# Patient Record
Sex: MALE | Race: WHITE | Employment: OTHER | URBAN - METROPOLITAN AREA
[De-identification: names, ages, dates, MRNs, and addresses within clinical notes are randomized per-mention and may not be internally consistent; named-entity substitution may affect disease eponyms.]

---

## 2017-02-15 PROBLEM — Z85.51 HISTORY OF BLADDER CANCER: Status: ACTIVE | Noted: 2017-02-15

## 2017-04-20 PROBLEM — K43.9 VENTRAL HERNIA WITHOUT OBSTRUCTION OR GANGRENE: Status: ACTIVE | Noted: 2017-04-20

## 2017-05-01 ENCOUNTER — CARE COORDINATION (OUTPATIENT)
Dept: CARE COORDINATION | Age: 72
End: 2017-05-01

## 2017-09-01 PROBLEM — M94.262 CHONDROMALACIA, LEFT KNEE: Status: ACTIVE | Noted: 2017-09-01

## 2017-09-01 RX ORDER — LOSARTAN POTASSIUM 25 MG/1
25 TABLET ORAL DAILY
Qty: 30 TABLET | Refills: 5 | Status: SHIPPED | OUTPATIENT
Start: 2017-09-01 | End: 2022-05-26

## 2017-12-06 ENCOUNTER — TELEPHONE (OUTPATIENT)
Dept: FAMILY MEDICINE CLINIC | Age: 72
End: 2017-12-06

## 2017-12-06 NOTE — TELEPHONE ENCOUNTER
Pt called to request appt for Pre Op physical for upcoming hand surgery on 1/25/18. Pt not sure if appt is needed as he was just in to see Dr. Donovan Rivera not too long ago. Please advise.

## 2017-12-19 ENCOUNTER — OFFICE VISIT (OUTPATIENT)
Dept: FAMILY MEDICINE CLINIC | Age: 72
End: 2017-12-19

## 2017-12-19 VITALS
DIASTOLIC BLOOD PRESSURE: 80 MMHG | TEMPERATURE: 98.3 F | SYSTOLIC BLOOD PRESSURE: 172 MMHG | HEIGHT: 69 IN | BODY MASS INDEX: 34.13 KG/M2 | HEART RATE: 77 BPM | OXYGEN SATURATION: 98 % | WEIGHT: 230.4 LBS

## 2017-12-19 DIAGNOSIS — I10 ESSENTIAL HYPERTENSION: ICD-10-CM

## 2017-12-19 DIAGNOSIS — Z01.818 PRE-OP EXAM: Primary | ICD-10-CM

## 2017-12-19 DIAGNOSIS — E78.5 HYPERLIPIDEMIA, UNSPECIFIED HYPERLIPIDEMIA TYPE: ICD-10-CM

## 2017-12-19 PROCEDURE — G8484 FLU IMMUNIZE NO ADMIN: HCPCS | Performed by: FAMILY MEDICINE

## 2017-12-19 PROCEDURE — 99214 OFFICE O/P EST MOD 30 MIN: CPT | Performed by: FAMILY MEDICINE

## 2017-12-19 PROCEDURE — 93000 ELECTROCARDIOGRAM COMPLETE: CPT | Performed by: FAMILY MEDICINE

## 2017-12-19 PROCEDURE — G8427 DOCREV CUR MEDS BY ELIG CLIN: HCPCS | Performed by: FAMILY MEDICINE

## 2017-12-19 PROCEDURE — G8417 CALC BMI ABV UP PARAM F/U: HCPCS | Performed by: FAMILY MEDICINE

## 2017-12-19 PROCEDURE — 4040F PNEUMOC VAC/ADMIN/RCVD: CPT | Performed by: FAMILY MEDICINE

## 2017-12-19 PROCEDURE — 3017F COLORECTAL CA SCREEN DOC REV: CPT | Performed by: FAMILY MEDICINE

## 2017-12-19 ASSESSMENT — PATIENT HEALTH QUESTIONNAIRE - PHQ9
2. FEELING DOWN, DEPRESSED OR HOPELESS: 0
1. LITTLE INTEREST OR PLEASURE IN DOING THINGS: 0
SUM OF ALL RESPONSES TO PHQ9 QUESTIONS 1 & 2: 0
SUM OF ALL RESPONSES TO PHQ QUESTIONS 1-9: 0

## 2017-12-19 NOTE — PROGRESS NOTES
Preoperative Consultation    Liat Sigala  YOB: 1945    This patient presents to the office today for a preoperative consultation at the request of surgeon, Dr. Willem Baird, who plans on performing left thumb carpometacarpal joint arthroplasty with tendon transfer on December 26 at [facility]. Has been getting pain left MCP joint for some time; worsening. Has been getting cortisone shots for last 2 years and last 2 haven't helped. Planned anesthesia: uncertain; not on preoperative paperwork.     Known anesthesia problems: None   Bleeding risk: No recent or remote history of abnormal bleeding  Personal or FH of DVT/PE: No      Patient Active Problem List   Diagnosis    Pure hypercholesterolemia    Villous adenoma    Hypertension    Cataract, right,mature    Carcinoma of rectum, [facility] ([facility] [address]    History of cancer chemotherapy    History of external beam radiation therapy    Cirrhosis, cryptogenic ([facility] [address]    Cervical disc disease    History of bladder cancer    Ventral hernia without obstruction or gangrene    Chondromalacia, left knee     Past Surgical History:   Procedure Laterality Date    ABDOMEN SURGERY  1/2016    ap resection for rectal cancer    CERVICAL DISC ARTHROPLASTY  2015    C5    COLONOSCOPY  4/2/14    with ultrasound, biopsy taken    CYSTOSCOPY      exc bladder tumor    KNEE ARTHROSCOPY Left     OTHER SURGICAL HISTORY      EUS with fine needle biopsy of lymph node 6/8/11    OTHER SURGICAL HISTORY Left 6/29/15    left port a cath placement    RECTAL SURGERY      for cancer    SHOULDER ARTHROSCOPY         Allergies   Allergen Reactions    Lisinopril Other (See Comments)     Itchy throat with swelling     Outpatient Prescriptions Marked as Taking for the 12/19/17 encounter (Office Visit) with Pierre Key MD   Medication Sig Dispense Refill    losartan (COZAAR) 25 MG tablet Take 1 tablet by mouth daily 30 tablet 5 oropharynx is clear and moist and mucous membranes are normal.   Neck: Normal range of motion. Neck supple. No thyromegaly present. Cardiovascular: Normal rate, regular rhythm and normal heart sounds. No murmur heard. Pulses:       Posterior tibial pulses are 2+ on the right side, and 2+ on the left side. 2+pedal pulses; no LE edema   Pulmonary/Chest: Effort normal and breath sounds normal. No respiratory distress. He has no wheezes. He has no rales. Abdominal: Soft. Bowel sounds are normal. There is no tenderness. There is no rebound. Colostomy bag attached. Lymphadenopathy:     He has no cervical adenopathy. EKG Interpretation:  normal EKG, normal sinus rhythm, unchanged from previous tracings. Lab Review NA       Assessment:       Chayo Pat was seen today for pre-op exam.    Diagnoses and all orders for this visit:    Pre-op exam  -     EKG 12 Lead    Essential hypertension  -     CBC with Differential; Future  -     Comprehensive Metabolic Panel; Future    Hyperlipidemia, unspecified hyperlipidemia type  -     TSH without Reflex; Future  -     Lipid Panel; Future      67 y.o. patient  approved for Surgery         Plan:     1. Preoperative workup as follows: I have ordered his routine blood work which he is overdue for. He last had lipids checked in 2015, and they were elevated at that time. We discussed that if still elevated it would be recommended that he start on statin therapy for reduction in cardiovascular risk. 2. Change in medication regimen before surgery: I have asked him to check his blood pressures at home and if elevated we will increase his losartan to 50 mg daily. He states that he has whitecoat hypertension, but I worry that his blood pressure is remaining elevated outside of the office. He will return in 2 weeks for nurse visit and recheck blood pressure at that time. 3. No contraindications to planned surgery    Note electronically signed by provider.

## 2017-12-19 NOTE — PATIENT INSTRUCTIONS
If your blood pressures are routinely 140/90 or higher, then go ahead and double up on the losartan.

## 2017-12-20 ENCOUNTER — HOSPITAL ENCOUNTER (OUTPATIENT)
Dept: OTHER | Age: 72
Discharge: HOME | End: 2017-12-20
Attending: FAMILY MEDICINE | Admitting: FAMILY MEDICINE

## 2017-12-20 DIAGNOSIS — I10 ESSENTIAL HYPERTENSION: ICD-10-CM

## 2017-12-20 DIAGNOSIS — E78.5 HYPERLIPIDEMIA, UNSPECIFIED HYPERLIPIDEMIA TYPE: ICD-10-CM

## 2017-12-20 LAB
A/G RATIO: 1.4 (ref 1.1–2.2)
ALBUMIN SERPL-MCNC: 4.1 G/DL (ref 3.4–5)
ALP BLD-CCNC: 63 U/L (ref 40–129)
ALT SERPL-CCNC: 13 U/L (ref 10–40)
ANION GAP SERPL CALCULATED.3IONS-SCNC: 11 MMOL/L (ref 3–16)
AST SERPL-CCNC: 19 U/L (ref 15–37)
BASOPHILS ABSOLUTE: 0.1 K/UL (ref 0–0.2)
BASOPHILS RELATIVE PERCENT: 1.1 %
BILIRUB SERPL-MCNC: 0.4 MG/DL (ref 0–1)
BUN BLDV-MCNC: 16 MG/DL (ref 7–20)
CALCIUM SERPL-MCNC: 9.7 MG/DL (ref 8.3–10.6)
CHLORIDE BLD-SCNC: 104 MMOL/L (ref 99–110)
CHOLESTEROL, TOTAL: 227 MG/DL (ref 0–199)
CO2: 28 MMOL/L (ref 21–32)
CREAT SERPL-MCNC: 0.7 MG/DL (ref 0.8–1.3)
EOSINOPHILS ABSOLUTE: 0.3 K/UL (ref 0–0.6)
EOSINOPHILS RELATIVE PERCENT: 5 %
GFR AFRICAN AMERICAN: >60
GFR NON-AFRICAN AMERICAN: >60
GLOBULIN: 2.9 G/DL
GLUCOSE BLD-MCNC: 97 MG/DL (ref 70–99)
HCT VFR BLD CALC: 44.6 % (ref 40.5–52.5)
HDLC SERPL-MCNC: 37 MG/DL (ref 40–60)
HEMOGLOBIN: 14.7 G/DL (ref 13.5–17.5)
LDL CHOLESTEROL CALCULATED: 165 MG/DL
LYMPHOCYTES ABSOLUTE: 1.8 K/UL (ref 1–5.1)
LYMPHOCYTES RELATIVE PERCENT: 31.9 %
MCH RBC QN AUTO: 29.8 PG (ref 26–34)
MCHC RBC AUTO-ENTMCNC: 33 G/DL (ref 31–36)
MCV RBC AUTO: 90.3 FL (ref 80–100)
MONOCYTES ABSOLUTE: 0.5 K/UL (ref 0–1.3)
MONOCYTES RELATIVE PERCENT: 9.1 %
NEUTROPHILS ABSOLUTE: 3 K/UL (ref 1.7–7.7)
NEUTROPHILS RELATIVE PERCENT: 52.9 %
PDW BLD-RTO: 14.7 % (ref 12.4–15.4)
PLATELET # BLD: 147 K/UL (ref 135–450)
PMV BLD AUTO: 10.1 FL (ref 5–10.5)
POTASSIUM SERPL-SCNC: 4.5 MMOL/L (ref 3.5–5.1)
RBC # BLD: 4.95 M/UL (ref 4.2–5.9)
SODIUM BLD-SCNC: 143 MMOL/L (ref 136–145)
TOTAL PROTEIN: 7 G/DL (ref 6.4–8.2)
TRIGL SERPL-MCNC: 124 MG/DL (ref 0–150)
TSH SERPL DL<=0.05 MIU/L-ACNC: 3.32 UIU/ML (ref 0.27–4.2)
VLDLC SERPL CALC-MCNC: 25 MG/DL
WBC # BLD: 5.6 K/UL (ref 4–11)

## 2018-10-08 ENCOUNTER — OFFICE VISIT (OUTPATIENT)
Dept: FAMILY MEDICINE CLINIC | Age: 73
End: 2018-10-08
Payer: MEDICARE

## 2018-10-08 VITALS
TEMPERATURE: 98.2 F | WEIGHT: 227.8 LBS | HEART RATE: 83 BPM | OXYGEN SATURATION: 98 % | SYSTOLIC BLOOD PRESSURE: 138 MMHG | HEIGHT: 69 IN | DIASTOLIC BLOOD PRESSURE: 80 MMHG | BODY MASS INDEX: 33.74 KG/M2

## 2018-10-08 DIAGNOSIS — Z85.51 HISTORY OF BLADDER CANCER: ICD-10-CM

## 2018-10-08 DIAGNOSIS — Z23 NEED FOR IMMUNIZATION AGAINST INFLUENZA: ICD-10-CM

## 2018-10-08 DIAGNOSIS — Z01.818 PREOP EXAMINATION: Primary | ICD-10-CM

## 2018-10-08 PROCEDURE — 99214 OFFICE O/P EST MOD 30 MIN: CPT | Performed by: PHYSICIAN ASSISTANT

## 2018-10-08 PROCEDURE — 1036F TOBACCO NON-USER: CPT | Performed by: PHYSICIAN ASSISTANT

## 2018-10-08 PROCEDURE — 4040F PNEUMOC VAC/ADMIN/RCVD: CPT | Performed by: PHYSICIAN ASSISTANT

## 2018-10-08 PROCEDURE — G8427 DOCREV CUR MEDS BY ELIG CLIN: HCPCS | Performed by: PHYSICIAN ASSISTANT

## 2018-10-08 PROCEDURE — 1101F PT FALLS ASSESS-DOCD LE1/YR: CPT | Performed by: PHYSICIAN ASSISTANT

## 2018-10-08 PROCEDURE — G0008 ADMIN INFLUENZA VIRUS VAC: HCPCS | Performed by: PHYSICIAN ASSISTANT

## 2018-10-08 PROCEDURE — 1123F ACP DISCUSS/DSCN MKR DOCD: CPT | Performed by: PHYSICIAN ASSISTANT

## 2018-10-08 PROCEDURE — G8482 FLU IMMUNIZE ORDER/ADMIN: HCPCS | Performed by: PHYSICIAN ASSISTANT

## 2018-10-08 PROCEDURE — 90662 IIV NO PRSV INCREASED AG IM: CPT | Performed by: PHYSICIAN ASSISTANT

## 2018-10-08 PROCEDURE — 3017F COLORECTAL CA SCREEN DOC REV: CPT | Performed by: PHYSICIAN ASSISTANT

## 2018-10-08 PROCEDURE — G8417 CALC BMI ABV UP PARAM F/U: HCPCS | Performed by: PHYSICIAN ASSISTANT

## 2018-10-08 ASSESSMENT — PATIENT HEALTH QUESTIONNAIRE - PHQ9
1. LITTLE INTEREST OR PLEASURE IN DOING THINGS: 0
2. FEELING DOWN, DEPRESSED OR HOPELESS: 0
SUM OF ALL RESPONSES TO PHQ QUESTIONS 1-9: 0
SUM OF ALL RESPONSES TO PHQ QUESTIONS 1-9: 0
SUM OF ALL RESPONSES TO PHQ9 QUESTIONS 1 & 2: 0

## 2018-10-08 NOTE — PROGRESS NOTES
Vaccine Information Sheet, \"Influenza - Inactivated\"  given to Lori Walters, or parent/legal guardian of  Lori Walters and verbalized understanding. Patient responses:    Have you ever had a reaction to a flu vaccine? No  Are you able to eat eggs without adverse effects? Yes  Do you have any current illness? No  Have you ever had Guillian [place] Syndrome? No    Flu vaccine given per order. Please see immunization tab.     Immunization(s) given during visit:     Immunizations     Name Date Dose Route    Influenza, High Dose (Fluzone 65 yrs and older) 10/8/2018 0.5 mL Intramuscular    Site: Deltoid- Left    Lot: [id number]    NDC: 16039-495-07

## 2019-04-15 ENCOUNTER — CARE COORDINATION (OUTPATIENT)
Dept: CASE MANAGEMENT | Age: 74
End: 2019-04-15

## 2019-04-15 NOTE — CARE COORDINATION
[facility] Transitions Initial Follow Up Call    Call within 2 business days of discharge: Yes    Patient: Beauty Galeazzi Patient : 1945   MRN: <[id number]>  Reason for Admission: SBO  Discharge Date: 19 RARS: No data recorded    Last Discharge [facility]     None           Spoke with: Sabine Jane [facility]    Facility: [facility]    Non-face-to-face services provided:  Obtained and reviewed discharge summary and/or continuity of care documents    Care Transitions 24 Hour Call    Do you have any ongoing symptoms?:  No  Do you have a copy of your discharge instructions?:  Yes  Do you have all of your prescriptions and are they filled?:  Yes  Have you been contacted by a [employer] [address]?:  No  Have you scheduled your follow up appointment?:  No  Were you discharged with any Home Care or Post Acute Services:  No  Post Acute Services:  Home Health  Do you feel like you have everything you need to keep you well at home?:  Yes  Care Transitions Interventions  No Identified Needs         Follow Up: Sabine Jane, she stated that patient is home and doing \"ok\", he has colostomy, she stated that she is eating and drinking fluids, plan is for bowel surgery, no date yet, patient will f/u with surgeon next week and has transportation, they have everything they need. Patient is not having pain, severe bloating or discomfort, fever, chills, chest pain, sob. Sabine Jane stated patient does not have [address], he has and is taking his medications, no issues, needs, or concerns to report per Sabine Jane, she is aware of when to call patient's doctor or report to ED for worsening or severe sx. No future appointments.     Ahsan Tello RN BSN  Care Transitions Coordinator

## 2019-05-14 ENCOUNTER — OFFICE VISIT (OUTPATIENT)
Dept: FAMILY MEDICINE CLINIC | Age: 74
End: 2019-05-14
Payer: MEDICARE

## 2019-05-14 VITALS
OXYGEN SATURATION: 98 % | DIASTOLIC BLOOD PRESSURE: 80 MMHG | WEIGHT: 227 LBS | SYSTOLIC BLOOD PRESSURE: 132 MMHG | BODY MASS INDEX: 33.28 KG/M2 | HEART RATE: 93 BPM | TEMPERATURE: 98.5 F

## 2019-05-14 DIAGNOSIS — I10 ESSENTIAL HYPERTENSION: ICD-10-CM

## 2019-05-14 DIAGNOSIS — Z01.818 PREOP EXAMINATION: Primary | ICD-10-CM

## 2019-05-14 DIAGNOSIS — K43.9 VENTRAL HERNIA WITHOUT OBSTRUCTION OR GANGRENE: ICD-10-CM

## 2019-05-14 PROCEDURE — G8427 DOCREV CUR MEDS BY ELIG CLIN: HCPCS | Performed by: PHYSICIAN ASSISTANT

## 2019-05-14 PROCEDURE — 3017F COLORECTAL CA SCREEN DOC REV: CPT | Performed by: PHYSICIAN ASSISTANT

## 2019-05-14 PROCEDURE — 99214 OFFICE O/P EST MOD 30 MIN: CPT | Performed by: PHYSICIAN ASSISTANT

## 2019-05-14 PROCEDURE — 1123F ACP DISCUSS/DSCN MKR DOCD: CPT | Performed by: PHYSICIAN ASSISTANT

## 2019-05-14 PROCEDURE — G8417 CALC BMI ABV UP PARAM F/U: HCPCS | Performed by: PHYSICIAN ASSISTANT

## 2019-05-14 PROCEDURE — 4040F PNEUMOC VAC/ADMIN/RCVD: CPT | Performed by: PHYSICIAN ASSISTANT

## 2019-05-14 PROCEDURE — 1036F TOBACCO NON-USER: CPT | Performed by: PHYSICIAN ASSISTANT

## 2019-05-14 ASSESSMENT — PATIENT HEALTH QUESTIONNAIRE - PHQ9
SUM OF ALL RESPONSES TO PHQ QUESTIONS 1-9: 0
SUM OF ALL RESPONSES TO PHQ QUESTIONS 1-9: 0
1. LITTLE INTEREST OR PLEASURE IN DOING THINGS: 0
2. FEELING DOWN, DEPRESSED OR HOPELESS: 0
SUM OF ALL RESPONSES TO PHQ9 QUESTIONS 1 & 2: 0

## 2019-05-14 NOTE — PATIENT INSTRUCTIONS
Baylee Seth was seen today for pre-op exam.    Diagnoses and all orders for this visit:    Preop examination    Ventral hernia without obstruction or gangrene    Essential hypertension       Take your BP medicine the morning of surgery.

## 2019-05-14 NOTE — PROGRESS NOTES
Preoperative Consultation    Alisa Evans  YOB: 1945    This patient presents to the office today for a preoperative consultation at the request of surgeon, Dr. James Dasilva, who plans on performing ventral hernia repair near colostomy site on May 17 at [facility].      He has stable, controlled, and treated HTN. It was elevated when he first got here but much better now. No SE's to his medicine.     Planned anesthesia: General   Known anesthesia problems: None   Bleeding risk: No recent or remote history of abnormal bleeding  Personal or FH of DVT/PE: No      Patient Active Problem List   Diagnosis    Pure hypercholesterolemia    Villous adenoma    Hypertension    Cataract, right,mature    Carcinoma of rectum, [facility] ([facility] [address]    History of cancer chemotherapy    History of external beam radiation therapy    Cirrhosis, cryptogenic ([address]    Cervical disc disease    History of bladder cancer    Ventral hernia without obstruction or gangrene    Chondromalacia, left knee     Past Surgical History:   Procedure Laterality Date    ABDOMEN SURGERY  1/2016    ap resection for rectal cancer    CERVICAL DISC ARTHROPLASTY  2015    C5    COLONOSCOPY  4/2/14    with ultrasound, biopsy taken    CYSTOSCOPY      exc bladder tumor    KNEE ARTHROSCOPY Left     OTHER SURGICAL HISTORY      EUS with fine needle biopsy of lymph node 6/8/11    OTHER SURGICAL HISTORY Left 6/29/15    left port a cath placement    RECTAL SURGERY      for cancer    SHOULDER ARTHROSCOPY         Allergies   Allergen Reactions    Lisinopril Other (See Comments)     Itchy throat with swelling     Outpatient Medications Marked as Taking for the 5/14/19 encounter (Office Visit) with ELLIE Gordillo   Medication Sig Dispense Refill    losartan (COZAAR) 25 MG tablet Take 1 tablet by mouth daily 30 tablet 5       Social History     Tobacco Use    Smoking status: Former Smoker     Packs/day: 1.00     Years: 40.00 Pack years: 40.00     Types: Cigarettes     Last attempt to quit: 1/6/2016     Years since quitting: 3.3    Smokeless tobacco: Never Used    Tobacco comment: quit 1/6/2016   Substance Use Topics    Alcohol use: Yes     Alcohol/week: 0.0 oz     Types: 2 Cans of beer per week     Comment: rare     Family History   Problem Relation Age of Onset    Diabetes Sister     Diabetes Brother     Other Maternal Aunt         cirrhosis, was drinker       Review of Systems  A comprehensive review of systems was negative except for what was noted in the HPI. Physical Exam   Wt 227 lb (103 kg)   BMI 33.28 kg/m²   Weight: 227 lb (103 kg)   Constitutional: He is oriented to person, place, and time. He appears well-developed and well-nourished. No distress. HENT:   Head: Normocephalic and atraumatic. Mouth/Throat: Uvula is midline, oropharynx is clear and moist and mucous membranes are normal.   Eyes: Conjunctivae and EOM are normal. Pupils are equal, round, and reactive to light. Neck: Trachea normal and normal range of motion. Neck supple. No JVD present. Carotid bruit is not present. No mass and no thyromegaly present. Cardiovascular: Normal rate, regular rhythm, normal heart sounds and intact distal pulses. Exam reveals no gallop and no friction rub. No murmur heard. Pulmonary/Chest: Effort normal and breath sounds normal. No respiratory distress. He has no wheezes. He has no rales. Abdominal: Soft. Normal aorta and bowel sounds are normal. He exhibits no distension and no mass. There is no hepatosplenomegaly. No tenderness. Musculoskeletal: He exhibits no edema and no tenderness. Neurological: He is alert and oriented to person, place, and time. He has normal strength. No cranial nerve deficit or sensory deficit. Coordination and gait normal.   Skin: Skin is warm and dry. No rash noted. No erythema.      EKG Interpretation: had a recent one done    Lab Review: PAT's yesterday       Assessment:

## 2019-10-24 ENCOUNTER — ANESTHESIA EVENT (OUTPATIENT)
Dept: ENDOSCOPY | Age: 74
End: 2019-10-24
Payer: MEDICARE

## 2019-10-25 ENCOUNTER — ANESTHESIA (OUTPATIENT)
Dept: ENDOSCOPY | Age: 74
End: 2019-10-25
Payer: MEDICARE

## 2019-10-25 ENCOUNTER — HOSPITAL ENCOUNTER (OUTPATIENT)
Age: 74
Setting detail: OUTPATIENT SURGERY
Discharge: HOME | End: 2019-10-25
Attending: INTERNAL MEDICINE | Admitting: INTERNAL MEDICINE
Payer: MEDICARE

## 2019-10-25 VITALS
TEMPERATURE: 97 F | SYSTOLIC BLOOD PRESSURE: 151 MMHG | WEIGHT: 220 LBS | OXYGEN SATURATION: 98 % | DIASTOLIC BLOOD PRESSURE: 73 MMHG | HEART RATE: 61 BPM | HEIGHT: 69 IN | BODY MASS INDEX: 32.58 KG/M2 | RESPIRATION RATE: 16 BRPM

## 2019-10-25 VITALS
OXYGEN SATURATION: 97 % | RESPIRATION RATE: 21 BRPM | SYSTOLIC BLOOD PRESSURE: 113 MMHG | DIASTOLIC BLOOD PRESSURE: 53 MMHG

## 2019-10-25 PROCEDURE — 6360000002 HC RX W HCPCS: Performed by: NURSE ANESTHETIST, CERTIFIED REGISTERED

## 2019-10-25 PROCEDURE — 2580000003 HC RX 258: Performed by: NURSE ANESTHETIST, CERTIFIED REGISTERED

## 2019-10-25 PROCEDURE — 3700000000 HC ANESTHESIA ATTENDED CARE: Performed by: INTERNAL MEDICINE

## 2019-10-25 PROCEDURE — 7100000011 HC PHASE II RECOVERY - ADDTL 15 MIN: Performed by: INTERNAL MEDICINE

## 2019-10-25 PROCEDURE — 2709999900 HC NON-CHARGEABLE SUPPLY: Performed by: INTERNAL MEDICINE

## 2019-10-25 PROCEDURE — 3700000001 HC ADD 15 MINUTES (ANESTHESIA): Performed by: INTERNAL MEDICINE

## 2019-10-25 PROCEDURE — 7100000010 HC PHASE II RECOVERY - FIRST 15 MIN: Performed by: INTERNAL MEDICINE

## 2019-10-25 PROCEDURE — 2500000003 HC RX 250 WO HCPCS: Performed by: NURSE ANESTHETIST, CERTIFIED REGISTERED

## 2019-10-25 PROCEDURE — 3609010400 HC COLONOSCOPY POLYPECTOMY HOT BIOPSY: Performed by: INTERNAL MEDICINE

## 2019-10-25 PROCEDURE — 88305 TISSUE EXAM BY PATHOLOGIST: CPT

## 2019-10-25 PROCEDURE — 7100000000 HC PACU RECOVERY - FIRST 15 MIN: Performed by: INTERNAL MEDICINE

## 2019-10-25 RX ORDER — LABETALOL HYDROCHLORIDE 5 MG/ML
5 INJECTION, SOLUTION INTRAVENOUS EVERY 10 MIN PRN
Status: DISCONTINUED | OUTPATIENT
Start: 2019-10-25 | End: 2019-10-25 | Stop reason: HOSPADM

## 2019-10-25 RX ORDER — SODIUM CHLORIDE 9 MG/ML
INJECTION, SOLUTION INTRAVENOUS CONTINUOUS PRN
Status: DISCONTINUED | OUTPATIENT
Start: 2019-10-25 | End: 2019-10-25 | Stop reason: SDUPTHER

## 2019-10-25 RX ORDER — PROPOFOL 10 MG/ML
INJECTION, EMULSION INTRAVENOUS CONTINUOUS PRN
Status: DISCONTINUED | OUTPATIENT
Start: 2019-10-25 | End: 2019-10-25 | Stop reason: SDUPTHER

## 2019-10-25 RX ORDER — HYDRALAZINE HYDROCHLORIDE 20 MG/ML
5 INJECTION INTRAMUSCULAR; INTRAVENOUS
Status: DISCONTINUED | OUTPATIENT
Start: 2019-10-25 | End: 2019-10-25 | Stop reason: HOSPADM

## 2019-10-25 RX ORDER — OXYCODONE HYDROCHLORIDE AND ACETAMINOPHEN 5; 325 MG/1; MG/1
1 TABLET ORAL PRN
Status: DISCONTINUED | OUTPATIENT
Start: 2019-10-25 | End: 2019-10-25 | Stop reason: HOSPADM

## 2019-10-25 RX ORDER — ONDANSETRON 2 MG/ML
INJECTION INTRAMUSCULAR; INTRAVENOUS PRN
Status: DISCONTINUED | OUTPATIENT
Start: 2019-10-25 | End: 2019-10-25 | Stop reason: SDUPTHER

## 2019-10-25 RX ORDER — MORPHINE SULFATE 2 MG/ML
2 INJECTION, SOLUTION INTRAMUSCULAR; INTRAVENOUS EVERY 5 MIN PRN
Status: DISCONTINUED | OUTPATIENT
Start: 2019-10-25 | End: 2019-10-25 | Stop reason: HOSPADM

## 2019-10-25 RX ORDER — MORPHINE SULFATE 2 MG/ML
1 INJECTION, SOLUTION INTRAMUSCULAR; INTRAVENOUS EVERY 5 MIN PRN
Status: DISCONTINUED | OUTPATIENT
Start: 2019-10-25 | End: 2019-10-25 | Stop reason: HOSPADM

## 2019-10-25 RX ORDER — LIDOCAINE HYDROCHLORIDE 20 MG/ML
INJECTION, SOLUTION EPIDURAL; INFILTRATION; INTRACAUDAL; PERINEURAL PRN
Status: DISCONTINUED | OUTPATIENT
Start: 2019-10-25 | End: 2019-10-25 | Stop reason: SDUPTHER

## 2019-10-25 RX ORDER — PROPOFOL 10 MG/ML
INJECTION, EMULSION INTRAVENOUS PRN
Status: DISCONTINUED | OUTPATIENT
Start: 2019-10-25 | End: 2019-10-25 | Stop reason: SDUPTHER

## 2019-10-25 RX ORDER — MEPERIDINE HYDROCHLORIDE 25 MG/ML
12.5 INJECTION INTRAMUSCULAR; INTRAVENOUS; SUBCUTANEOUS EVERY 5 MIN PRN
Status: DISCONTINUED | OUTPATIENT
Start: 2019-10-25 | End: 2019-10-25 | Stop reason: HOSPADM

## 2019-10-25 RX ORDER — OXYCODONE HYDROCHLORIDE AND ACETAMINOPHEN 5; 325 MG/1; MG/1
2 TABLET ORAL PRN
Status: DISCONTINUED | OUTPATIENT
Start: 2019-10-25 | End: 2019-10-25 | Stop reason: HOSPADM

## 2019-10-25 RX ORDER — SODIUM CHLORIDE 0.9 % (FLUSH) 0.9 %
10 SYRINGE (ML) INJECTION EVERY 12 HOURS SCHEDULED
Status: DISCONTINUED | OUTPATIENT
Start: 2019-10-25 | End: 2019-10-25 | Stop reason: HOSPADM

## 2019-10-25 RX ORDER — SODIUM CHLORIDE 9 MG/ML
INJECTION, SOLUTION INTRAVENOUS CONTINUOUS
Status: DISCONTINUED | OUTPATIENT
Start: 2019-10-25 | End: 2019-10-25 | Stop reason: HOSPADM

## 2019-10-25 RX ORDER — ONDANSETRON 2 MG/ML
4 INJECTION INTRAMUSCULAR; INTRAVENOUS
Status: DISCONTINUED | OUTPATIENT
Start: 2019-10-25 | End: 2019-10-25 | Stop reason: HOSPADM

## 2019-10-25 RX ORDER — SODIUM CHLORIDE 0.9 % (FLUSH) 0.9 %
10 SYRINGE (ML) INJECTION PRN
Status: DISCONTINUED | OUTPATIENT
Start: 2019-10-25 | End: 2019-10-25 | Stop reason: HOSPADM

## 2019-10-25 RX ADMIN — ONDANSETRON 4 MG: 2 INJECTION INTRAMUSCULAR; INTRAVENOUS at 07:45

## 2019-10-25 RX ADMIN — PROPOFOL 30 MG: 10 INJECTION, EMULSION INTRAVENOUS at 07:42

## 2019-10-25 RX ADMIN — LIDOCAINE HYDROCHLORIDE 60 MG: 20 INJECTION, SOLUTION EPIDURAL; INFILTRATION; INTRACAUDAL; PERINEURAL at 07:42

## 2019-10-25 RX ADMIN — PROPOFOL 180 MCG/KG/MIN: 10 INJECTION, EMULSION INTRAVENOUS at 07:42

## 2019-10-25 RX ADMIN — SODIUM CHLORIDE: 9 INJECTION, SOLUTION INTRAVENOUS at 07:27

## 2019-10-25 ASSESSMENT — PULMONARY FUNCTION TESTS
PIF_VALUE: 0
PIF_VALUE: 0
PIF_VALUE: 2
PIF_VALUE: 0
PIF_VALUE: 1
PIF_VALUE: 0
PIF_VALUE: 0
PIF_VALUE: 1
PIF_VALUE: 0
PIF_VALUE: 1
PIF_VALUE: 1
PIF_VALUE: 0
PIF_VALUE: 0
PIF_VALUE: 1
PIF_VALUE: 0
PIF_VALUE: 1
PIF_VALUE: 0
PIF_VALUE: 0
PIF_VALUE: 1
PIF_VALUE: 0
PIF_VALUE: 1
PIF_VALUE: 0
PIF_VALUE: 1
PIF_VALUE: 1
PIF_VALUE: 0
PIF_VALUE: 1
PIF_VALUE: 0

## 2019-10-25 ASSESSMENT — PAIN SCALES - GENERAL
PAINLEVEL_OUTOF10: 0

## 2019-10-25 ASSESSMENT — ENCOUNTER SYMPTOMS: SHORTNESS OF BREATH: 0

## 2019-10-25 ASSESSMENT — PAIN - FUNCTIONAL ASSESSMENT: PAIN_FUNCTIONAL_ASSESSMENT: 0-10

## 2022-05-24 RX ORDER — LOSARTAN POTASSIUM 25 MG/1
25 TABLET ORAL DAILY
Qty: 90 TABLET | Refills: 0 | OUTPATIENT
Start: 2022-05-24

## 2022-05-26 ENCOUNTER — OFFICE VISIT (OUTPATIENT)
Dept: FAMILY MEDICINE CLINIC | Age: 77
End: 2022-05-26
Payer: MEDICARE

## 2022-05-26 VITALS
TEMPERATURE: 97.7 F | WEIGHT: 244 LBS | SYSTOLIC BLOOD PRESSURE: 148 MMHG | DIASTOLIC BLOOD PRESSURE: 84 MMHG | BODY MASS INDEX: 36.14 KG/M2 | HEIGHT: 69 IN

## 2022-05-26 DIAGNOSIS — H61.23 BILATERAL IMPACTED CERUMEN: ICD-10-CM

## 2022-05-26 DIAGNOSIS — E78.00 PURE HYPERCHOLESTEROLEMIA: Primary | ICD-10-CM

## 2022-05-26 DIAGNOSIS — C44.90 SKIN CANCER: ICD-10-CM

## 2022-05-26 DIAGNOSIS — I10 PRIMARY HYPERTENSION: ICD-10-CM

## 2022-05-26 DIAGNOSIS — E66.01 SEVERE OBESITY (BMI 35.0-39.9) WITH COMORBIDITY (HCC): ICD-10-CM

## 2022-05-26 DIAGNOSIS — Z12.5 SCREENING FOR MALIGNANT NEOPLASM OF PROSTATE: ICD-10-CM

## 2022-05-26 DIAGNOSIS — R73.9 HYPERGLYCEMIA: ICD-10-CM

## 2022-05-26 PROCEDURE — 99204 OFFICE O/P NEW MOD 45 MIN: CPT | Performed by: FAMILY MEDICINE

## 2022-05-26 PROCEDURE — G8417 CALC BMI ABV UP PARAM F/U: HCPCS | Performed by: FAMILY MEDICINE

## 2022-05-26 PROCEDURE — 1123F ACP DISCUSS/DSCN MKR DOCD: CPT | Performed by: FAMILY MEDICINE

## 2022-05-26 PROCEDURE — G8427 DOCREV CUR MEDS BY ELIG CLIN: HCPCS | Performed by: FAMILY MEDICINE

## 2022-05-26 PROCEDURE — 1036F TOBACCO NON-USER: CPT | Performed by: FAMILY MEDICINE

## 2022-05-26 RX ORDER — LOSARTAN POTASSIUM 50 MG/1
50 TABLET ORAL DAILY
Qty: 90 TABLET | Refills: 1 | Status: SHIPPED | OUTPATIENT
Start: 2022-05-26 | End: 2022-07-06

## 2022-05-26 ASSESSMENT — PATIENT HEALTH QUESTIONNAIRE - PHQ9
SUM OF ALL RESPONSES TO PHQ9 QUESTIONS 1 & 2: 0
SUM OF ALL RESPONSES TO PHQ QUESTIONS 1-9: 0
1. LITTLE INTEREST OR PLEASURE IN DOING THINGS: 0
SUM OF ALL RESPONSES TO PHQ QUESTIONS 1-9: 0
SUM OF ALL RESPONSES TO PHQ QUESTIONS 1-9: 0
2. FEELING DOWN, DEPRESSED OR HOPELESS: 0
SUM OF ALL RESPONSES TO PHQ QUESTIONS 1-9: 0

## 2022-05-26 ASSESSMENT — ENCOUNTER SYMPTOMS
SHORTNESS OF BREATH: 1
DIARRHEA: 1
CHEST TIGHTNESS: 0
CONSTIPATION: 0
BACK PAIN: 0
RHINORRHEA: 0

## 2022-05-26 NOTE — PROGRESS NOTES
SUBJECTIVE:    Tracey Maya is a 67 y.o. male who presents as a new patient. Chief Complaint   Patient presents with    Follow-up     Patient is here for a follow up. Has a sore on back of neck will not heal, x months. Issues with hearing. Hypertension  This is a chronic problem. The current episode started more than 1 year ago. The problem is uncontrolled. Associated symptoms include shortness of breath ( CASEY with mowing lawn). Pertinent negatives include no chest pain. Risk factors for coronary artery disease include sedentary lifestyle, obesity, male gender and dyslipidemia. Past treatments include angiotensin blockers. The current treatment provides moderate improvement. Compliance problems include exercise and diet. Hyperlipidemia  This is a chronic problem. The current episode started more than 1 year ago. Associated symptoms include shortness of breath ( CASEY with mowing lawn). Pertinent negatives include no chest pain or myalgias. Compliance problems include adherence to diet and adherence to exercise. Risk factors for coronary artery disease include dyslipidemia, hypertension, male sex, obesity and a sedentary lifestyle. Colon CA  Dx'd 6 yrs ago. Had chemo and rad tx. Last colonoscopy through the colostomy last yr. Had total colectomy. Labs have been good. Last visit with hematologist one yr ago. Considered cancer free. Skin Lesion  This is a chronic problem. Patient states he had a small bump arise on his posterior right shoulder few months ago. He noted that it has increased in size. He states he gets irritated from his shirt.       Past Medical History:   Diagnosis Date    Cancer [facility])     rectal and bladder    Hyperlipidemia     borderline-no meds    Hypertension     Tobacco use disorder     Ventral hernia 04/26/2017    Villous adenoma      Past Surgical History:   Procedure Laterality Date    ABDOMEN SURGERY  1/2016    ap resection for rectal cancer    CERVICAL DISC ARTHROPLASTY  2015    C5    COLONOSCOPY  14    with ultrasound, biopsy taken    COLONOSCOPY N/A 10/25/2019    COLONOSCOPY POLYPECTOMY REMOVAL HOT BIOPSY/STOMA performed by Clau Schafer MD at [place] [address] bladder tumor   [address] ARTHROSCOPY Left     OTHER SURGICAL HISTORY      EUS with fine needle biopsy of lymph node 11    OTHER SURGICAL HISTORY Left 6/29/15    left port a cath placement    RECTAL SURGERY      for cancer    SHOULDER ARTHROSCOPY       Family History   Problem Relation Age of Onset    Lung Cancer Mother     Early Death Father     Diabetes Sister     Diabetes Brother      Social History     Tobacco Use    Smoking status: Former Smoker     Packs/day: 1.00     Years: 40.00     Pack years: 40.00     Types: Cigarettes     Quit date: 2016     Years since quittin.3    Smokeless tobacco: Never Used    Tobacco comment: quit 2016   Substance Use Topics    Alcohol use: Yes     Alcohol/week: 0.0 standard drinks     Types: 2 Cans of beer per week     Comment: rare      Allergies   Allergen Reactions    Lisinopril Other (See Comments)     Itchy throat with swelling-difficulty swallowing     No current outpatient medications on file prior to visit. No current facility-administered medications on file prior to visit. Review of Systems   Constitutional: Negative for activity change, fatigue and unexpected weight change. HENT: Positive for hearing loss. Negative for congestion, postnasal drip and rhinorrhea. Respiratory: Positive for shortness of breath ( CASEY with mowing lawn). Negative for chest tightness. Cardiovascular: Positive for leg swelling ( mild). Negative for chest pain. Gastrointestinal: Positive for diarrhea ( has a colostomy). Negative for constipation. Genitourinary: Negative for difficulty urinating. Musculoskeletal: Negative for arthralgias, back pain and myalgias.    Neurological: Negative for dizziness and light-headedness. Psychiatric/Behavioral: Negative for dysphoric mood and sleep disturbance. The patient is not nervous/anxious. OBJECTIVE:    BP (!) 148/84   Temp 97.7 °F (36.5 °C)   Ht 5' 9\" (1.753 m)   Wt 244 lb (110.7 kg)   BMI 36.03 kg/m²    Vitals:    05/26/22 0738 05/26/22 0814   BP: (!) 146/84 (!) 148/84   Temp: 97.7 °F (36.5 °C)    Weight: 244 lb (110.7 kg)    Height: 5' 9\" (1.753 m)        Physical Exam  Constitutional:       General: He is not in acute distress. Appearance: He is well-developed. He is obese. HENT:      Head: Normocephalic and atraumatic. Right Ear: External ear normal. Decreased hearing noted. There is impacted cerumen. Left Ear: External ear normal. Decreased hearing noted. There is impacted cerumen. Nose: Nose normal.      Mouth/Throat:      Mouth: Mucous membranes are moist.      Pharynx: No posterior oropharyngeal erythema. Eyes:      General:         Right eye: No discharge. Conjunctiva/sclera: Conjunctivae normal.   Neck:      Thyroid: No thyromegaly. Vascular: No JVD. Trachea: No tracheal deviation. Cardiovascular:      Rate and Rhythm: Normal rate and regular rhythm. Heart sounds: Normal heart sounds. Pulmonary:      Effort: Pulmonary effort is normal. No respiratory distress. Breath sounds: Normal breath sounds. No rales. Musculoskeletal:      Cervical back: Normal range of motion and neck supple. Right lower leg: Edema present. Left lower leg: Edema present. Lymphadenopathy:      Cervical: No cervical adenopathy. Skin:     General: Skin is warm and dry. Findings: Lesion ( Raised erythematous lesion right posterior shoulder upper back consistent with probable basal cell carcinoma) present. Neurological:      Mental Status: He is alert and oriented to person, place, and time.    Psychiatric:         Mood and Affect: Mood normal.         Behavior: Behavior normal.

## 2022-06-06 ENCOUNTER — OFFICE VISIT (OUTPATIENT)
Dept: SURGERY | Age: 77
End: 2022-06-06
Payer: MEDICARE

## 2022-06-06 VITALS
DIASTOLIC BLOOD PRESSURE: 80 MMHG | SYSTOLIC BLOOD PRESSURE: 138 MMHG | BODY MASS INDEX: 36.29 KG/M2 | WEIGHT: 245 LBS | HEIGHT: 69 IN

## 2022-06-06 DIAGNOSIS — L98.9 SKIN LESION OF BACK: Primary | ICD-10-CM

## 2022-06-06 PROCEDURE — 99203 OFFICE O/P NEW LOW 30 MIN: CPT | Performed by: SURGERY

## 2022-06-06 PROCEDURE — G8417 CALC BMI ABV UP PARAM F/U: HCPCS | Performed by: SURGERY

## 2022-06-06 PROCEDURE — 1123F ACP DISCUSS/DSCN MKR DOCD: CPT | Performed by: SURGERY

## 2022-06-06 PROCEDURE — 1036F TOBACCO NON-USER: CPT | Performed by: SURGERY

## 2022-06-06 PROCEDURE — G8427 DOCREV CUR MEDS BY ELIG CLIN: HCPCS | Performed by: SURGERY

## 2022-06-06 ASSESSMENT — ENCOUNTER SYMPTOMS
COLOR CHANGE: 0
CHEST TIGHTNESS: 0
APNEA: 0
ABDOMINAL PAIN: 0
EYE DISCHARGE: 0
BACK PAIN: 0
ABDOMINAL DISTENTION: 0
EYE ITCHING: 0

## 2022-06-06 NOTE — Clinical Note
Thank you for sending Jose Chad Underwood.  Right upper back skin lesion is amenable to in office excision and will schedule in the near future at his convenience

## 2022-06-06 NOTE — PROGRESS NOTES
[facility]  SUBJECTIVE:    Chief Complaint: Skin lesion    Greg Dias   1945   67 y.o. male presents for evaluation of a skin lesion on right upper back present for approximately 1 year growing and intermittently bleeds. Has never been pigmented and does not hurt. No other similar lesions and no other complaints. No personal or family history of skin cancer specifically melanoma. No other significant medical conditions    Review of Systems   Constitutional: Negative for activity change and appetite change. HENT: Negative for congestion and dental problem. Eyes: Negative for discharge and itching. Respiratory: Negative for apnea and chest tightness. Cardiovascular: Negative for chest pain and leg swelling. Gastrointestinal: Negative for abdominal distention and abdominal pain. Endocrine: Negative for cold intolerance and heat intolerance. Genitourinary: Negative for difficulty urinating and dysuria. Musculoskeletal: Negative for arthralgias and back pain. Skin: Negative for color change and pallor. Allergic/Immunologic: Negative for environmental allergies and food allergies. Neurological: Negative for dizziness and facial asymmetry. Hematological: Negative for adenopathy. Does not bruise/bleed easily. Psychiatric/Behavioral: Negative for agitation and behavioral problems.        Past Medical History:   Diagnosis Date    Cancer [facility])     rectal and bladder    Hyperlipidemia     borderline-no meds    Hypertension     Tobacco use disorder     Ventral hernia 04/26/2017    Villous adenoma      Past Surgical History:   Procedure Laterality Date    ABDOMEN SURGERY  1/2016    ap resection for rectal cancer    CERVICAL DISC ARTHROPLASTY  2015    C5    COLONOSCOPY  4/2/14    with ultrasound, biopsy taken    COLONOSCOPY N/A 10/25/2019    COLONOSCOPY POLYPECTOMY REMOVAL HOT BIOPSY/STOMA performed by Jimbo Valenzuela [facility] ENDOSCOPY    CYSTOSCOPY      exc bladder tumor    HERNIA REPAIR      KNEE ARTHROSCOPY Left     OTHER SURGICAL HISTORY      EUS with fine needle biopsy of lymph node 11    OTHER SURGICAL HISTORY Left 6/29/15    left port a cath placement    RECTAL SURGERY      for cancer    SHOULDER ARTHROSCOPY       Social History     Socioeconomic History    Marital status:      Spouse name: Not on file    Number of children: Not on file    Years of education: Not on file    Highest education level: Not on file   Occupational History    Not on file   Tobacco Use    Smoking status: Former Smoker     Packs/day: 1.00     Years: 40.00     Pack years: 40.00     Types: Cigarettes     Quit date: 2016     Years since quittin.4    Smokeless tobacco: Never Used    Tobacco comment: quit 2016   Vaping Use    Vaping Use: Never used   Substance and Sexual Activity    Alcohol use: Yes     Alcohol/week: 0.0 standard drinks     Types: 2 Cans of beer per week     Comment: rare    Drug use: Never    Sexual activity: Not Currently   Other Topics Concern    Not on file   Social History Narrative    Not on file     Social Determinants of Health     Financial Resource Strain:     Difficulty of Paying Living Expenses: Not on file   Food Insecurity:     Worried About Running Out of Food in the Last Year: Not on file    Norma of Food in the Last Year: Not on file   Transportation Needs:     Lack of Transportation (Medical): Not on file    Lack of Transportation (Non-Medical):  Not on file   Physical Activity:     Days of Exercise per Week: Not on file    Minutes of Exercise per Session: Not on file   Stress:     Feeling of Stress : Not on file   Social Connections:     Frequency of Communication with Friends and Family: Not on file    Frequency of Social Gatherings with Friends and Family: Not on file    Attends Taoist Services: Not on file    Active Member of Clubs or Organizations: Not on file  Attends Club or Organization Meetings: Not on file    Marital Status: Not on file   Intimate Partner Violence:     Fear of Current or Ex-Partner: Not on file    Emotionally Abused: Not on file    Physically Abused: Not on file    Sexually Abused: Not on file   Housing Stability:     Unable to Pay for Housing in the Last Year: Not on file    Number of [place] in the Last Year: Not on file    Unstable Housing in the Last Year: Not on file      Family History   Problem Relation Age of Onset    Lung Cancer Mother     Early Death Father     Diabetes Sister     Diabetes Brother      Current Outpatient Medications   Medication Sig Dispense Refill    carbamide peroxide (DEBROX) 6.5 % otic solution Place 5 drops into both ears 2 times daily 15 mL 1    losartan (COZAAR) 50 mg tablet Take 1 tablet by mouth daily 90 tablet 1     No current facility-administered medications for this visit. Allergies   Allergen Reactions    Lisinopril Other (See Comments)     Itchy throat with swelling-difficulty swallowing        OBJECTIVE:  /80   Ht 5' 9\" (1.753 m)   Wt 245 lb (111.1 kg)   BMI 36.18 kg/m²    Physical Exam  Vitals reviewed. Constitutional:       Appearance: He is well-developed. HENT:      Head: Normocephalic and atraumatic. Eyes:      Conjunctiva/sclera: Conjunctivae normal.      Pupils: Pupils are equal, round, and reactive to light. Skin:     General: Skin is warm and dry. Neurological:      Mental Status: He is alert and oriented to person, place, and time. Labs:  No visits with results within 6 Week(s) from this visit.    Latest known visit with results is:   Hospital Outpatient Visit on 12/20/2017   Component Date Value Ref Range Status    Sodium 12/20/2017 143  136 - 145 mmol/L Final    Potassium 12/20/2017 4.5  3.5 - 5.1 mmol/L Final    Chloride 12/20/2017 104  99 - 110 mmol/L Final    CO2 12/20/2017 28  21 - 32 mmol/L Final    Anion Gap 12/20/2017 11  3 - 16 Final    Glucose 12/20/2017 97  70 - 99 mg/dL Final    BUN 12/20/2017 16  7 - 20 mg/dL Final    CREATININE 12/20/2017 0.7* 0.8 - 1.3 mg/dL Final    GFR Non- 12/20/2017 >60  >60 Final    Comment: >60 mL/min/1.73m2 EGFR, calc. for ages 25 and older using the  MDRD formula (not corrected for weight), is valid for stable  renal function.  GFR  12/20/2017 >60  >60 Final    Comment: Chronic Kidney Disease: less than 60 ml/min/1.73 sq.m. Kidney Failure: less than 15 ml/min/1.73 sq.m. Results valid for patients 18 years and older.       Calcium 12/20/2017 9.7  8.3 - 10.6 mg/dL Final    Total Protein 12/20/2017 7.0  6.4 - 8.2 g/dL Final    Albumin 12/20/2017 4.1  3.4 - 5.0 g/dL Final    Albumin/Globulin Ratio 12/20/2017 1.4  1.1 - 2.2 Final    Total Bilirubin 12/20/2017 0.4  0.0 - 1.0 mg/dL Final    Alkaline Phosphatase 12/20/2017 63  40 - 129 U/L Final    ALT 12/20/2017 13  10 - 40 U/L Final    AST 12/20/2017 19  15 - 37 U/L Final    Globulin 12/20/2017 2.9  g/dL Final    TSH 12/20/2017 3.32  0.27 - 4.20 uIU/mL Final    Cholesterol, Total 12/20/2017 227* 0 - 199 mg/dL Final    Triglycerides 12/20/2017 124  0 - 150 mg/dL Final    HDL 12/20/2017 37* 40 - 60 mg/dL Final    LDL Calculated 12/20/2017 165* <100 mg/dL Final    VLDL Cholesterol Calculated 12/20/2017 25  Not Established mg/dL Final    WBC 12/20/2017 5.6  4.0 - 11.0 K/uL Final    RBC 12/20/2017 4.95  4.20 - 5.90 M/uL Final    Hemoglobin 12/20/2017 14.7  13.5 - 17.5 g/dL Final    Hematocrit 12/20/2017 44.6  40.5 - 52.5 % Final    MCV 12/20/2017 90.3  80.0 - 100.0 fL Final    MCH 12/20/2017 29.8  26.0 - 34.0 pg Final    MCHC 12/20/2017 33.0  31.0 - 36.0 g/dL Final    RDW 12/20/2017 14.7  12.4 - 15.4 % Final    Platelets 74/51/8800 147  135 - 450 K/uL Final    MPV 12/20/2017 10.1  5.0 - 10.5 fL Final    Neutrophils % 12/20/2017 52.9  % Final    Lymphocytes % 12/20/2017 31.9  % Final    Monocytes % 12/20/2017 9.1  % Final    Eosinophils % 12/20/2017 5.0  % Final    Basophils % 12/20/2017 1.1  % Final    Neutrophils Absolute 12/20/2017 3.0  1.7 - 7.7 K/uL Final    Lymphocytes Absolute 12/20/2017 1.8  1.0 - 5.1 K/uL Final    Monocytes Absolute 12/20/2017 0.5  0.0 - 1.3 K/uL Final    Eosinophils Absolute 12/20/2017 0.3  0.0 - 0.6 K/uL Final    Basophils Absolute 12/20/2017 0.1  0.0 - 0.2 K/uL Final       Imaging:  No results found. ASSESSMENT:  Right upper back skin lesion    PLAN:  1. We discussed the risks of surgery including bleeding, infection, as well as the cost and pain related to the procedure. Benefits of excision include resolution of symptoms and definitive tissue diagnosis. Alternatives include close observation. The patient understands, agrees, and wishes to proceed with excision  2. We also discussed anesthesia options including general anesthesia, local anesthetic only, and local anesthetic with sedation. General anesthesia provides the most patient relaxation but at the cost of higher risk as it includes endotracheal intubation. Local anesthetic poses the least risk to the patient but is the most uncomfortable. Additional benefit of local anesthetic only is that the patient could drive home from the procedure. The patient has chosen local anesthetic only  3. Will schedule for excision of skin lesion on right upper back with local anesthetic only as outpatient procedure to be performed in office     Jf Baird MD, FACS  6/6/2022  10:58 AM

## 2022-06-08 ENCOUNTER — PROCEDURE VISIT (OUTPATIENT)
Dept: SURGERY | Age: 77
End: 2022-06-08
Payer: MEDICARE

## 2022-06-08 VITALS — SYSTOLIC BLOOD PRESSURE: 158 MMHG | DIASTOLIC BLOOD PRESSURE: 74 MMHG | BODY MASS INDEX: 36.18 KG/M2 | WEIGHT: 245 LBS

## 2022-06-08 DIAGNOSIS — L98.9 SKIN LESION OF BACK: Primary | ICD-10-CM

## 2022-06-08 PROCEDURE — 99999 PR OFFICE/OUTPT VISIT,PROCEDURE ONLY: CPT | Performed by: SURGERY

## 2022-06-08 PROCEDURE — 11402 EXC TR-EXT B9+MARG 1.1-2 CM: CPT | Performed by: SURGERY

## 2022-06-08 NOTE — PROGRESS NOTES
Office Procedure Note  Indications:   Mohan Wyatt   : 1945   Today: 2022  67 y.o. male presents with a growing skin lesion on upper back concerning for skin malignancy for excision    Procedure: Excision of skin lesion on upper back    Anesthesia: Subcutaneous lidocaine    Procedure Details   Using clean technique, the skin lesion on upper back was cleaned with betadine scrub. Local anesthetic was injected around the skin lesion circumferentially. An elliptical incision was made around the skin lesion. The widest skin lesion diameter with narrowest excised diameter was 1.5 cm and The length of the incision was 2.5 cm. The lesion was removed in its entirety and passed off as specimen. The incision was closed with interrupted 3-0 nylon suture. The wound was dressed with antibiotic ointment, gauze, tape. The patient tolerated the procedure well without complications    Bleeding:  Minimal    Hemostasis Achieved:  by pressure    Response to treatment:  Well tolerated by patient. Post-procedure dressing:  Antibiotic ointment, gauze and tape    Post-procedure instructions:  Keep dressing as dry as possible. Leave dressing in place for 48 hours and then may remove. Replace daily with dry dressing as needed, Reapply antibiotic ointment daily and May shower after dressing is removed. Avoid scrubbing over incision  Pain control with tylenol and ibuprofen with food as needed and Minimize heavy lifting for 2 weeks  Return to office in 10-14 days for suture removal  Will discuss pathology at return office visit    [address]  Shiva Tomlinson MD, FACS  2022  2:36 PM

## 2022-06-21 ENCOUNTER — OFFICE VISIT (OUTPATIENT)
Dept: SURGERY | Age: 77
End: 2022-06-21

## 2022-06-21 ENCOUNTER — OFFICE VISIT (OUTPATIENT)
Dept: ENT CLINIC | Age: 77
End: 2022-06-21
Payer: MEDICARE

## 2022-06-21 VITALS — BODY MASS INDEX: 36.18 KG/M2 | SYSTOLIC BLOOD PRESSURE: 172 MMHG | WEIGHT: 245 LBS | DIASTOLIC BLOOD PRESSURE: 65 MMHG

## 2022-06-21 VITALS — SYSTOLIC BLOOD PRESSURE: 192 MMHG | TEMPERATURE: 97.1 F | DIASTOLIC BLOOD PRESSURE: 72 MMHG | HEART RATE: 67 BPM

## 2022-06-21 DIAGNOSIS — H61.23 IMPACTED CERUMEN OF BOTH EARS: ICD-10-CM

## 2022-06-21 DIAGNOSIS — L98.9 SKIN LESION OF BACK: Primary | ICD-10-CM

## 2022-06-21 DIAGNOSIS — H91.93 BILATERAL HEARING LOSS, UNSPECIFIED HEARING LOSS TYPE: ICD-10-CM

## 2022-06-21 DIAGNOSIS — H90.0 CONDUCTIVE HEARING LOSS OF BOTH EARS: ICD-10-CM

## 2022-06-21 DIAGNOSIS — H93.13 SUBJECTIVE TINNITUS OF BOTH EARS: Primary | ICD-10-CM

## 2022-06-21 PROCEDURE — 1123F ACP DISCUSS/DSCN MKR DOCD: CPT | Performed by: OTOLARYNGOLOGY

## 2022-06-21 PROCEDURE — 99203 OFFICE O/P NEW LOW 30 MIN: CPT | Performed by: OTOLARYNGOLOGY

## 2022-06-21 PROCEDURE — G8417 CALC BMI ABV UP PARAM F/U: HCPCS | Performed by: OTOLARYNGOLOGY

## 2022-06-21 PROCEDURE — G8427 DOCREV CUR MEDS BY ELIG CLIN: HCPCS | Performed by: OTOLARYNGOLOGY

## 2022-06-21 PROCEDURE — 99024 POSTOP FOLLOW-UP VISIT: CPT | Performed by: SURGERY

## 2022-06-21 PROCEDURE — 1036F TOBACCO NON-USER: CPT | Performed by: OTOLARYNGOLOGY

## 2022-06-21 RX ORDER — SULFAMETHOXAZOLE AND TRIMETHOPRIM 800; 160 MG/1; MG/1
1 TABLET ORAL 2 TIMES DAILY
Qty: 10 TABLET | Refills: 0 | Status: SHIPPED | OUTPATIENT
Start: 2022-06-21 | End: 2022-06-26

## 2022-06-21 ASSESSMENT — ENCOUNTER SYMPTOMS
RECTAL PAIN: 1
RHINORRHEA: 0
SORE THROAT: 0
SINUS PAIN: 0

## 2022-06-21 NOTE — PATIENT INSTRUCTIONS
1. Schedule an audiogram (hearing test). 2. Schedule an appointment for ear recheck and possible cleaning in the future if your hearing is decreased, or you have a sensation of ear wax build up or are told you have ear wax build up by your primary physician or other health care provider. 3. You may use an over the counter ear wax removal kit (such as Murine, Bausch and Lomb, NeilMed, or Debrox wax removal system) for ear wax removal, as needed. 4. It may help to use Debrox (OTC) for 4 days prior to future visits for ear cleaning. This may soften your ear wax and facilitate removal of the wax. NO Q-TIPS OR OTHER INSTRUMENTS/OBJECTS IN THE EARS   You should never clean your ears with a Q-tip, cotton tipped applicator, Aimee pin, paper clip, safety pin, pen cap, or any other instrument. This will tend to push wax in deeper and pack the ear canal with wax. There is a high risk and danger of this practice, especially rupture of ear drum, dislocation or other damage to ossicles, and permanent, irreversible, and irreparable hearing loss. It may cause inflammation and irritation of the ear canal and cause itching or pain. I recommend only use of one the several ear wax removal kits available \"over the counter\" if you feel a need to try to remove ear wax. For example, Murine, Bausch and Lomb, NeilMed, or Debrox ear wax removal kits may be used for ear wax removal, as needed. No other methods should be self used for cleaning your ears.

## 2022-06-21 NOTE — PROGRESS NOTES
[facility]       NEW PATIENT VISIT      PCP:  Kimberly Carey MD      REFERRED BY:   self      CHIEF COMPLAINT  Chief Complaint   Patient presents with    Ear Problem     ear cleaning , hard of hearing       HISTORY OF PRESENT ILLNESS       Emily Chacon is a 67 y.o. male who presented today for evaluation and management for an ear/hearing problem. Two weeks ago, had phone next to ear and made a loud noise in his ear, felt discomfort in ear, later noticed cetain sounds sound like static, at [employer] can't hear the bells anymore. He has noticed that certain words have a crackling to them. Hearing loss for many years, both ears, constant. 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 REVIEW OF SYSTEMS   Review of Systems   Constitutional: Negative for chills and fever. HENT: Positive for hearing loss and tinnitus. Negative for ear discharge, ear pain, rhinorrhea, sinus pain and sore throat. Gastrointestinal: Positive for rectal pain. Neurological: Negative for dizziness.          PAST MEDICAL HISTORY    Past Medical History:   Diagnosis Date    Cancer [facility])     rectal and bladder    Hyperlipidemia     borderline-no meds    Hypertension     Tobacco use disorder     Ventral hernia 04/26/2017    Villous adenoma        Past Surgical History:   Procedure Laterality Date    ABDOMEN SURGERY  1/2016    ap resection for rectal cancer    CERVICAL DISC ARTHROPLASTY  2015    C5    COLONOSCOPY  4/2/14    with ultrasound, biopsy taken    COLONOSCOPY N/A 10/25/2019    COLONOSCOPY POLYPECTOMY REMOVAL HOT BIOPSY/STOMA performed by Bertrand Cosme MD at [facility] 60 bladder tumor   [address]  ARTHROSCOPY Left     OTHER SURGICAL HISTORY      EUS with fine needle biopsy of lymph node 6/8/11    OTHER SURGICAL HISTORY Left 6/29/15    left port a cath placement    RECTAL SURGERY      for cancer    SHOULDER ARTHROSCOPY           EXAMINATION    Vitals:    06/21/22 1253   BP: (!) 192/72   Site: Right Upper Arm   Position: Sitting   Cuff Size: Large Adult   Pulse: 67   Temp: 97.1 °F (36.2 °C)   TempSrc: Temporal     General:  WDWN, NAD, alert and oriented  Face: There was no swelling or lesions detected. Voice: Normal with no hoarseness or hot potato voice. Ears:  EACs occluded by cerumen impaction. Nose: The nasal septum, turbinates, secretions, and mucosa appeared to be normal.   Sinuses:  Maxillary and frontal sinuses were nontender to palpation and percussion. Oral cavity:  Mucosa, secretions, tongue, and gingiva appeared to be normal.   Oropharynx:  Post tonsillectomy. The hard and soft palates, uvula, tongue, posterior oropharyngeal wall, mucosa and secretions appeared to be normal.     Salivary Glands:  Normal bilateral parotid and bilateral submandibular salivary glands. Neck:  No masses or tenderness. Trachea midline. Laryngeal cartilages and hyoid bone normal.    Thyroid:  Normal, nontender, no goiter or nodules palpable. Lymph nodes:  No cervical lymphadenopathy.         PROCEDURE - REMOVAL OF CERUMEN IMPACTION (23890):  Obstructing cerumen impaction occluding both of the EACs  and obscuring visualization of the both tympanic membranes, was removed under otomicroscopic visualization, with instrumentation, using a Billeau wire loop  Under otomicroscopic examination, the bilateral EACs appeared to be normal.  The TMs were dull and thickened with no erythema and with a thinner area in the anterior superior quadrant which ws hypermobile to pneumatic massage. Dawson Ash reported improved hearing, back to usual level, after cerumen removal.      HEARING ASSESSMENT (after ear cleaning):  Finger rub:  unable to hear finger rub bilaterally. Tuning fork tests, [address] tuning fork:  Royal test:  lateralizes right   Rinne test: right ear air greater than bone and left ear  air greater than bone           IMPRESSION / Celso Fail / Mak Mangle was seen today for ear problem. Diagnoses and all orders for this visit:    Subjective tinnitus of both ears  -     [facility]  [place], [facility]    Bilateral hearing loss, unspecified hearing loss type  -     [address] [place], [place], Audiology, [facility]    Impacted cerumen of both ears    Conductive hearing loss of both ears         RECOMMENDATIONS/PLAN      1. Audiogram.  2. Return for recheck/follow-up after hearing test.        Patient Instructions   1. Schedule an audiogram (hearing test). 2. Schedule an appointment for ear recheck and possible cleaning in the future if your hearing is decreased, or you have a sensation of ear wax build up or are told you have ear wax build up by your primary physician or other health care provider. 3. You may use an over the counter ear wax removal kit (such as Murine, Bausch and Lomb, NeilMed, or Debrox wax removal system) for ear wax removal, as needed. 4. It may help to use Debrox (OTC) for 4 days prior to future visits for ear cleaning. This may soften your ear wax and facilitate removal of the wax. NO Q-TIPS OR OTHER INSTRUMENTS/OBJECTS IN THE EARS   You should never clean your ears with a Q-tip, cotton tipped applicator, Aimee pin, paper clip, safety pin, pen cap, or any other instrument. This will tend to push wax in deeper and pack the ear canal with wax.   There is a high risk and danger of this practice, especially rupture of ear drum, dislocation or other damage to ossicles, and permanent, irreversible, and irreparable hearing loss. It may cause inflammation and irritation of the ear canal and cause itching or pain. I recommend only use of one the several ear wax removal kits available \"over the counter\" if you feel a need to try to remove ear wax. For example, Murine, Bausch and Lomb, NeilMed, or Debrox ear wax removal kits may be used for ear wax removal, as needed. No other methods should be self used for cleaning your ears.

## 2022-06-21 NOTE — PROGRESS NOTES
[facility] GENERAL AND LAPAROSCOPIC SURGERY          PATIENT NAME: Rj Solano     TODAY'S DATE: 6/21/2022    SUBJECTIVE:    Pt here for suture removal  Lesion on right posterior shoulder. OBJECTIVE:  VITALS:  BP (!) 172/65   Wt 245 lb (111.1 kg)   BMI 36.18 kg/m²     CONSTITUTIONAL:  awake and alert  SKIN: incision with sutures, erythematous and weeping purulent fluid    Data:      FINAL DIAGNOSIS:     Skin of back, excision:      - Poroma, excised.          ASSESSMENT AND PLAN:    S/P Poroma excision with Dr. Monalisa Palacios  Sutures removed, area with erythema and purulent drainage  Rx Bactrim and topical PSO X 5 days  See back with Dr. Monalisa Palacios in 7 days, expect will be fine then      Iglesia Pool MD

## 2022-06-27 ENCOUNTER — OFFICE VISIT (OUTPATIENT)
Dept: SURGERY | Age: 77
End: 2022-06-27

## 2022-06-27 VITALS — DIASTOLIC BLOOD PRESSURE: 70 MMHG | SYSTOLIC BLOOD PRESSURE: 143 MMHG | WEIGHT: 245 LBS | BODY MASS INDEX: 36.18 KG/M2

## 2022-06-27 DIAGNOSIS — Z09 SURGERY FOLLOW-UP: Primary | ICD-10-CM

## 2022-06-27 PROCEDURE — 99024 POSTOP FOLLOW-UP VISIT: CPT | Performed by: SURGERY

## 2022-06-27 NOTE — PATIENT INSTRUCTIONS
1. Local wound care with dry dressing as needed  2. No restrictions with regard to bathing, swimming  3. No restrictions with regard to diet  4. No restrictions with regard to activity  5. Pathology benign, no further screening or follow-up necessary  6. No signs of infection currently and does not need additional antibiotics  7.  Follow with general surgery as needed

## 2022-06-27 NOTE — PROGRESS NOTES
[address] and [facility]ar[facility]  SUBJECTIVE:    Tamara Angelo   1945   67 y.o. male presents for routine postoperative followup after excision of upper back skin lesion on 2022. Pathology benign. Required antibiotics for small drainage from incision but this has completely resolved. Pain resolved. No major complaints. Presents primarily for incision check    Past Medical History:   Diagnosis Date    Cancer [facility])     rectal and bladder    Hyperlipidemia     borderline-no meds    Hypertension     Tobacco use disorder     Ventral hernia 2017    Villous adenoma      Past Surgical History:   Procedure Laterality Date    ABDOMEN SURGERY  2016    ap resection for rectal cancer    CERVICAL DISC ARTHROPLASTY  2015    C5    COLONOSCOPY  14    with ultrasound, biopsy taken    COLONOSCOPY N/A 10/25/2019    COLONOSCOPY POLYPECTOMY REMOVAL HOT BIOPSY/STOMA performed by Luca Huang MD at [place] [address] bladder tumor   [address] ARTHROSCOPY Left     OTHER SURGICAL HISTORY      EUS with fine needle biopsy of lymph node 11    OTHER SURGICAL HISTORY Left 6/29/15    left port a cath placement    RECTAL SURGERY      for cancer    SHOULDER ARTHROSCOPY       Social History     Socioeconomic History    Marital status:      Spouse name: Not on file    Number of children: Not on file    Years of education: Not on file    Highest education level: Not on file   Occupational History    Not on file   Tobacco Use    Smoking status: Former Smoker     Packs/day: 1.00     Years: 40.00     Pack years: 40.00     Types: Cigarettes     Quit date: 2016     Years since quittin.4    Smokeless tobacco: Never Used    Tobacco comment: quit 2016   Vaping Use    Vaping Use: Never used   Substance and Sexual Activity    Alcohol use:  Yes     Alcohol/week: 0.0 standard drinks     Types: 2 Cans of beer per week Comment: rare    Drug use: Never    Sexual activity: Not Currently   Other Topics Concern    Not on file   Social History Narrative    Not on file     Social Determinants of Health     Financial Resource Strain:     Difficulty of Paying Living Expenses: Not on file   Food Insecurity:     Worried About Running Out of Food in the Last Year: Not on file    Norma of Food in the Last Year: Not on file   Transportation Needs:     Lack of Transportation (Medical): Not on file    Lack of Transportation (Non-Medical): Not on file   Physical Activity:     Days of Exercise per Week: Not on file    Minutes of Exercise per Session: Not on file   Stress:     Feeling of Stress : Not on file   Social Connections:     Frequency of Communication with Friends and Family: Not on file    Frequency of Social Gatherings with Friends and Family: Not on file    Attends Quaker Services: Not on file    Active Member of [address] [place] [address] [employer] or Organizations: Not on file    Attends Club or Organization Meetings: Not on file    Marital Status: Not on file   Intimate Partner Violence:     Fear of Current or Ex-Partner: Not on file    Emotionally Abused: Not on file    Physically Abused: Not on file    Sexually Abused: Not on file   Housing Stability:     Unable to Pay for Housing in the Last Year: Not on file    Number of [place] in the Last Year: Not on file    Unstable Housing in the Last Year: Not on file      Family History   Problem Relation Age of Onset    Lung Cancer Mother     Early Death Father     Diabetes Sister     Diabetes Brother      Current Outpatient Medications   Medication Sig Dispense Refill    losartan (COZAAR) 50 mg tablet Take 1 tablet by mouth daily 90 tablet 1     No current facility-administered medications for this visit.       Allergies   Allergen Reactions    Lisinopril Other (See Comments)     Itchy throat with swelling-difficulty swallowing        Review of Systems:  Review of systems performed and negative with the exception of the above findings    OBJECTIVE:  BP (!) 143/70   Wt 245 lb (111.1 kg)   BMI 36.18 kg/m²      Physical Exam:  General appearance: alert, appears stated age, cooperative and no distress  Skin/wound: Incision clean dry and intact healing well, erythema minimal.  Nontender    No visits with results within 6 Week(s) from this visit. Latest known visit with results is:   Hospital Outpatient Visit on 12/20/2017   Component Date Value Ref Range Status    Sodium 12/20/2017 143  136 - 145 mmol/L Final    Potassium 12/20/2017 4.5  3.5 - 5.1 mmol/L Final    Chloride 12/20/2017 104  99 - 110 mmol/L Final    CO2 12/20/2017 28  21 - 32 mmol/L Final    Anion Gap 12/20/2017 11  3 - 16 Final    Glucose 12/20/2017 97  70 - 99 mg/dL Final    BUN 12/20/2017 16  7 - 20 mg/dL Final    CREATININE 12/20/2017 0.7* 0.8 - 1.3 mg/dL Final    GFR Non- 12/20/2017 >60  >60 Final    Comment: >60 mL/min/1.73m2 EGFR, calc. for ages 25 and older using the  MDRD formula (not corrected for weight), is valid for stable  renal function.  GFR  12/20/2017 >60  >60 Final    Comment: Chronic Kidney Disease: less than 60 ml/min/1.73 sq.m. Kidney Failure: less than 15 ml/min/1.73 sq.m. Results valid for patients 18 years and older.       Calcium 12/20/2017 9.7  8.3 - 10.6 mg/dL Final    Total Protein 12/20/2017 7.0  6.4 - 8.2 g/dL Final    Albumin 12/20/2017 4.1  3.4 - 5.0 g/dL Final    Albumin/Globulin Ratio 12/20/2017 1.4  1.1 - 2.2 Final    Total Bilirubin 12/20/2017 0.4  0.0 - 1.0 mg/dL Final    Alkaline Phosphatase 12/20/2017 63  40 - 129 U/L Final    ALT 12/20/2017 13  10 - 40 U/L Final    AST 12/20/2017 19  15 - 37 U/L Final    Globulin 12/20/2017 2.9  g/dL Final    TSH 12/20/2017 3.32  0.27 - 4.20 uIU/mL Final    Cholesterol, Total 12/20/2017 227* 0 - 199 mg/dL Final    Triglycerides 12/20/2017 124  0 - 150 mg/dL Final    HDL 12/20/2017 37* 40 - 60 mg/dL Final    LDL Calculated 12/20/2017 165* <100 mg/dL Final    VLDL Cholesterol Calculated 12/20/2017 25  Not Established mg/dL Final    WBC 12/20/2017 5.6  4.0 - 11.0 K/uL Final    RBC 12/20/2017 4.95  4.20 - 5.90 M/uL Final    Hemoglobin 12/20/2017 14.7  13.5 - 17.5 g/dL Final    Hematocrit 12/20/2017 44.6  40.5 - 52.5 % Final    MCV 12/20/2017 90.3  80.0 - 100.0 fL Final    MCH 12/20/2017 29.8  26.0 - 34.0 pg Final    MCHC 12/20/2017 33.0  31.0 - 36.0 g/dL Final    RDW 12/20/2017 14.7  12.4 - 15.4 % Final    Platelets 85/01/7641 147  135 - 450 K/uL Final    MPV 12/20/2017 10.1  5.0 - 10.5 fL Final    Neutrophils % 12/20/2017 52.9  % Final    Lymphocytes % 12/20/2017 31.9  % Final    Monocytes % 12/20/2017 9.1  % Final    Eosinophils % 12/20/2017 5.0  % Final    Basophils % 12/20/2017 1.1  % Final    Neutrophils Absolute 12/20/2017 3.0  1.7 - 7.7 K/uL Final    Lymphocytes Absolute 12/20/2017 1.8  1.0 - 5.1 K/uL Final    Monocytes Absolute 12/20/2017 0.5  0.0 - 1.3 K/uL Final    Eosinophils Absolute 12/20/2017 0.3  0.0 - 0.6 K/uL Final    Basophils Absolute 12/20/2017 0.1  0.0 - 0.2 K/uL Final       No results found. Pathology:  FINAL DIAGNOSIS:     Skin of back, excision:      - Poroma, excised. ANNALISE/ANNALISE       Assessment:  excision of upper back skin lesion on June 8, 2022. Pathology benign    Plan:  1. Local wound care with dry dressing as needed  2. No restrictions with regard to bathing, swimming  3. No restrictions with regard to diet  4. No restrictions with regard to activity  5. Pathology benign, no further screening or follow-up necessary  6. No signs of infection currently and does not need additional antibiotics  7. Follow with general surgery as needed    Jf Fletcher MD, FACS  6/27/2022  9:09 AM

## 2022-07-02 ENCOUNTER — HOSPITAL ENCOUNTER (OUTPATIENT)
Age: 77
Discharge: HOME | End: 2022-07-02
Payer: MEDICARE

## 2022-07-02 DIAGNOSIS — R73.9 HYPERGLYCEMIA: ICD-10-CM

## 2022-07-02 DIAGNOSIS — E78.00 PURE HYPERCHOLESTEROLEMIA: ICD-10-CM

## 2022-07-02 DIAGNOSIS — Z12.5 SCREENING FOR MALIGNANT NEOPLASM OF PROSTATE: ICD-10-CM

## 2022-07-02 LAB
A/G RATIO: 1.3 (ref 1.1–2.2)
ALBUMIN SERPL-MCNC: 4.1 G/DL (ref 3.4–5)
ALP BLD-CCNC: 71 U/L (ref 40–129)
ALT SERPL-CCNC: 14 U/L (ref 10–40)
ANION GAP SERPL CALCULATED.3IONS-SCNC: 14 MMOL/L (ref 3–16)
AST SERPL-CCNC: 22 U/L (ref 15–37)
BASOPHILS ABSOLUTE: 0.1 K/UL (ref 0–0.2)
BASOPHILS RELATIVE PERCENT: 1 %
BILIRUB SERPL-MCNC: 0.4 MG/DL (ref 0–1)
BUN BLDV-MCNC: 16 MG/DL (ref 7–20)
CALCIUM SERPL-MCNC: 9.3 MG/DL (ref 8.3–10.6)
CHLORIDE BLD-SCNC: 107 MMOL/L (ref 99–110)
CHOLESTEROL, FASTING: 224 MG/DL (ref 0–199)
CO2: 21 MMOL/L (ref 21–32)
CREAT SERPL-MCNC: 0.7 MG/DL (ref 0.8–1.3)
EOSINOPHILS ABSOLUTE: 0.2 K/UL (ref 0–0.6)
EOSINOPHILS RELATIVE PERCENT: 4 %
GFR AFRICAN AMERICAN: >60
GFR NON-AFRICAN AMERICAN: >60
GLUCOSE FASTING: 120 MG/DL (ref 70–99)
HCT VFR BLD CALC: 41.7 % (ref 40.5–52.5)
HDLC SERPL-MCNC: 31 MG/DL (ref 40–60)
HEMOGLOBIN: 14.2 G/DL (ref 13.5–17.5)
LDL CHOLESTEROL CALCULATED: 169 MG/DL
LYMPHOCYTES ABSOLUTE: 2.2 K/UL (ref 1–5.1)
LYMPHOCYTES RELATIVE PERCENT: 38.1 %
MCH RBC QN AUTO: 29.8 PG (ref 26–34)
MCHC RBC AUTO-ENTMCNC: 34 G/DL (ref 31–36)
MCV RBC AUTO: 87.6 FL (ref 80–100)
MONOCYTES ABSOLUTE: 0.5 K/UL (ref 0–1.3)
MONOCYTES RELATIVE PERCENT: 9.1 %
NEUTROPHILS ABSOLUTE: 2.8 K/UL (ref 1.7–7.7)
NEUTROPHILS RELATIVE PERCENT: 47.8 %
PDW BLD-RTO: 14.6 % (ref 12.4–15.4)
PLATELET # BLD: 140 K/UL (ref 135–450)
PMV BLD AUTO: 10.3 FL (ref 5–10.5)
POTASSIUM SERPL-SCNC: 4.3 MMOL/L (ref 3.5–5.1)
PROSTATE SPECIFIC ANTIGEN: 0.34 NG/ML (ref 0–4)
RBC # BLD: 4.76 M/UL (ref 4.2–5.9)
SODIUM BLD-SCNC: 142 MMOL/L (ref 136–145)
TOTAL PROTEIN: 7.2 G/DL (ref 6.4–8.2)
TRIGLYCERIDE, FASTING: 120 MG/DL (ref 0–150)
TSH REFLEX: 3.7 UIU/ML (ref 0.27–4.2)
VLDLC SERPL CALC-MCNC: 24 MG/DL
WBC # BLD: 5.8 K/UL (ref 4–11)

## 2022-07-02 PROCEDURE — 80061 LIPID PANEL: CPT

## 2022-07-02 PROCEDURE — 85025 COMPLETE CBC W/AUTO DIFF WBC: CPT

## 2022-07-02 PROCEDURE — 83036 HEMOGLOBIN GLYCOSYLATED A1C: CPT

## 2022-07-02 PROCEDURE — 36415 COLL VENOUS BLD VENIPUNCTURE: CPT

## 2022-07-02 PROCEDURE — 84443 ASSAY THYROID STIM HORMONE: CPT

## 2022-07-02 PROCEDURE — 84153 ASSAY OF PSA TOTAL: CPT

## 2022-07-02 PROCEDURE — 80053 COMPREHEN METABOLIC PANEL: CPT

## 2022-07-03 LAB
ESTIMATED AVERAGE GLUCOSE: 128.4 MG/DL
HBA1C MFR BLD: 6.1 %

## 2022-07-05 ENCOUNTER — PROCEDURE VISIT (OUTPATIENT)
Dept: AUDIOLOGY | Age: 77
End: 2022-07-05
Payer: MEDICARE

## 2022-07-05 DIAGNOSIS — H90.A32 MIXED CONDUCTIVE AND SENSORINEURAL HEARING LOSS OF LEFT EAR WITH RESTRICTED HEARING OF RIGHT EAR: Primary | ICD-10-CM

## 2022-07-05 PROCEDURE — 92557 COMPREHENSIVE HEARING TEST: CPT | Performed by: AUDIOLOGIST

## 2022-07-05 PROCEDURE — 92567 TYMPANOMETRY: CPT | Performed by: AUDIOLOGIST

## 2022-07-05 NOTE — PROGRESS NOTES
[facility]  Division of Audiology/Otolaryngology    7/5/2022     Patient name: Mary Beth Ernandez  Primary Care Physician: Maximino Palomino MD   Medical Record Number: [id number]     Mary Beth Ernandez   1945, 67 y.o. male   [id number]       Referring Provider: Nicky Lora MD  Referral Type: In an order in [address] [place] [address]    Reason for Visit: Evaluation of the cause of disorders of hearing, tinnitus, or balance. ADULT AUDIOLOGIC EVALUATION                    Mary Beth Ernandez is a 67 y.o. male seen today, 7/5/2022 , for a same-day add-on comprehensive audiologic evaluation. Patient was seen by Nicky Lora MD prior to today's evaluation. AUDIOLOGIC AND OTHER PERTINENT MEDICAL HISTORY:      Mary Beth Ernandez noted history of occupational  noise exposure, longstanding history of non-functional left sided hearing loss. Two weeks ago, had phone next to ear and made a loud noise in his ear, felt discomfort in ear, later noticed cetain sounds perceived as \"static\" with lack of clarity. Notes he was told he had a hole in his eardrum, could not recall which side. IMPRESSIONS:      Today's results are consistent with bilateral hearing loss that is asymmetrical in left ear with poor word recognition, and good recognition in right ear,  normal middle ear function of both ears. Hearing loss is significant enough to result in difficulty understanding speech in most listening environments. Discussed good communication strategies, and recommended hearing aid evaluation. Patient to follow medical recommendations per  Nicky Lora MD    ASSESSMENT AND FINDINGS:     Otoscopy revealed: Visible tympanic membrane, left perforated TM, right     Reliability: Good   Transducer: Inserts    RIGHT EAR:  Hearing Sensitivity: Moderate to severe sensorineural  hearing loss; isolated 25 dB air-bone gap at 4K.   Speech Recognition Threshold: 55 dB HL  Word Recognition: Good (80-89%), based on NU-6   Tympanometry: Normal peak pressure with low compliance, Type As tympanogram, consistent with reduced tympanic membrane mobility. Acoustic Reflexes: Ipsilateral: Absent     LEFT EAR:  Hearing Sensitivity: Severe mixed asymmetrical hearing loss; air-bone gap at 500 and 4000k. Speech Recognition Threshold: 105 dB HL  Word Recognition: Very Poor (0-59%), based on NU-6   Tympanometry: Normal peak pressure and compliance, Type A tympanogram, consistent with normal middle ear function. Acoustic Reflexes: Ipsilateral: Present at elevated sensation levels. COUNSELING:      Reviewed purpose of testing completed today, general auditory system function, and results obtained today. The following items are recommended based on patient report and results from today's appointment:   - Continue medical follow-up with Kita Raya MD.   - Retest hearing as medically indicated and/or sooner if a change in hearing is noted. - If desired, schedule a Hearing Aid Evaluation (HAE) appointment to discuss hearing aid options. Chart CC'd to: Kita Raya MD      Degree of   Hearing Sensitivity dB Range   Within Normal Limits (WNL) 0 - 20   Mild 20 - 40   Moderate 40 - 55   Moderately-Severe 55 - 70   Severe 70 - 90   Profound 90 +        RECOMMENDATIONS:      Has interest in amplification    Kita Raya MD to medically advise.     Herman Parra  Audiologist    Electronically signed by Herman Parra on 7/5/22 at 10:21 AM.

## 2022-07-05 NOTE — PROGRESS NOTES
[address] ARTHROPLASTY  2015    C5    COLONOSCOPY  14    with ultrasound, biopsy taken    COLONOSCOPY N/A 10/25/2019    COLONOSCOPY POLYPECTOMY REMOVAL HOT BIOPSY/STOMA performed by [facility] at [facility] bladder tumor   [address] ARTHROSCOPY Left     OTHER SURGICAL HISTORY      EUS with fine needle biopsy of lymph node 11    OTHER SURGICAL HISTORY Left 6/29/15    left port a cath placement    RECTAL SURGERY      for cancer    SHOULDER ARTHROSCOPY       Family History   Problem Relation Age of Onset    Lung Cancer Mother     Early Death Father     Diabetes Sister     Diabetes Brother      Social History     Tobacco Use    Smoking status: Former Smoker     Packs/day: 1.00     Years: 40.00     Pack years: 40.00     Types: Cigarettes     Quit date: 2016     Years since quittin.5    Smokeless tobacco: Never Used    Tobacco comment: quit 2016   Substance Use Topics    Alcohol use: Yes     Alcohol/week: 0.0 standard drinks     Types: 2 Cans of beer per week     Comment: rare      Allergies   Allergen Reactions    Lisinopril Other (See Comments)     Itchy throat with swelling-difficulty swallowing     No current outpatient medications on file prior to visit. No current facility-administered medications on file prior to visit. Review of Systems   Constitutional: Negative for fatigue. HENT: Negative for congestion, postnasal drip and rhinorrhea. Respiratory: Positive for shortness of breath ( mild, improved). Negative for chest tightness. Cardiovascular: Negative for chest pain. Gastrointestinal: Negative for constipation and diarrhea. Genitourinary: Negative for difficulty urinating. Musculoskeletal: Negative for arthralgias and back pain. Neurological: Negative for dizziness and light-headedness. Psychiatric/Behavioral: Negative for dysphoric mood and sleep disturbance. The patient is not nervous/anxious. by mouth nightly  -     Lipid, Fasting; Future  -     Comprehensive Metabolic Panel, Fasting; Future    Primary hypertension  Patient has not been checking his blood pressures and he does have a history of whitecoat hypertension. At his recent visit with ENT blood pressure was markedly elevated with a systolic in the 161V  -     losartan (COZAAR) 100 MG tablet; Take 1 tablet by mouth daily    Former smoker  Patient quit smoking in 2016 he has never had a low-dose CT of his chest  -     CT CHEST LOW DOSE (LDCT); Future  -     WA VISIT TO DISCUSS LUNG CA SCREEN W LDCT  -     CT Lung Screen (Annual); Future    CASEY (dyspnea on exertion)  Patient did complain of dyspnea on exertion at the last visit but he feels that the symptoms have gotten better. When walking on flat surface at a leisurely pace he denies shortness of breath but admits that if he gets in a hurry he will get more short of breath. -     [address] [facility] [address], [place], Cardiology, [facility]    Personal history of tobacco use  -     WA VISIT TO DISCUSS LUNG CA SCREEN W LDCT  -     CT Lung Screen (Annual); Future    Risk factors for heart disease  On review of his chart he has had elevated cholesterol and elevated blood pressure. He is a former smoker. He is overweight. I feel with his multiple risk factors he would benefit from further evaluation by cardiology. I have also started him on a statin today as well as increased his losartan from 50 to 100. We will also recheck his lipids as well as blood pressure in 3 months. Return in about 3 months (around 10/6/2022). Please note portions of this note were completed with a voicerecognition program.  Efforts were made to edit the dictations but occasionally words are mis-transcribed.   Low Dose CT (LDCT) Lung Screening criteria met:     Age 50-77(Medicare) or 50-80 (USPSTF)   Pack year smoking >20   Still smoking or less than 15 year since quit   No sign or symptoms of lung cancer   > 11 months since last LDCT     Risks and benefits of lung cancer screening with LDCT scans discussed:    Significance of positive screen - False-positive LDCT results often occur. 95% of all positive results do not lead to a diagnosis of cancer. Usually further imaging can resolve most false-positive results; however, some patients may require invasive procedures. Over diagnosis risk - 10% to 12% of screen-detected lung cancer cases are over diagnosed--that is, the cancer would not have been detected in the patient's lifetime without the screening. Need for follow up screens annually to continue lung cancer screening effectiveness     Risks associated with radiation from annual LDCT- Radiation exposure is about the same as for a mammogram, which is about 1/3 of the annual background radiation exposure from everyday life. Starting screening at age 54 is not likely to increase cancer risk from radiation exposure. Patients with comorbidities resulting in life expectancy of < 10 years, or that would preclude treatment of an abnormality identified on CT, should not be screened due to lack of benefit.     To obtain maximal benefit from this screening, smoking cessation and long-term abstinence from smoking is critical

## 2022-07-06 ENCOUNTER — OFFICE VISIT (OUTPATIENT)
Dept: FAMILY MEDICINE CLINIC | Age: 77
End: 2022-07-06
Payer: MEDICARE

## 2022-07-06 VITALS
TEMPERATURE: 97.3 F | SYSTOLIC BLOOD PRESSURE: 166 MMHG | DIASTOLIC BLOOD PRESSURE: 84 MMHG | OXYGEN SATURATION: 98 % | WEIGHT: 246 LBS | BODY MASS INDEX: 36.33 KG/M2 | HEART RATE: 68 BPM

## 2022-07-06 DIAGNOSIS — R06.09 DOE (DYSPNEA ON EXERTION): ICD-10-CM

## 2022-07-06 DIAGNOSIS — I10 PRIMARY HYPERTENSION: ICD-10-CM

## 2022-07-06 DIAGNOSIS — E78.00 PURE HYPERCHOLESTEROLEMIA: Primary | ICD-10-CM

## 2022-07-06 DIAGNOSIS — Z87.891 FORMER SMOKER: ICD-10-CM

## 2022-07-06 DIAGNOSIS — Z87.891 PERSONAL HISTORY OF TOBACCO USE: ICD-10-CM

## 2022-07-06 PROCEDURE — G0296 VISIT TO DETERM LDCT ELIG: HCPCS | Performed by: FAMILY MEDICINE

## 2022-07-06 PROCEDURE — G8417 CALC BMI ABV UP PARAM F/U: HCPCS | Performed by: FAMILY MEDICINE

## 2022-07-06 PROCEDURE — G8427 DOCREV CUR MEDS BY ELIG CLIN: HCPCS | Performed by: FAMILY MEDICINE

## 2022-07-06 PROCEDURE — 1036F TOBACCO NON-USER: CPT | Performed by: FAMILY MEDICINE

## 2022-07-06 PROCEDURE — 99214 OFFICE O/P EST MOD 30 MIN: CPT | Performed by: FAMILY MEDICINE

## 2022-07-06 PROCEDURE — 1123F ACP DISCUSS/DSCN MKR DOCD: CPT | Performed by: FAMILY MEDICINE

## 2022-07-06 RX ORDER — ROSUVASTATIN CALCIUM 10 MG/1
10 TABLET, COATED ORAL NIGHTLY
Qty: 30 TABLET | Refills: 3 | Status: SHIPPED | OUTPATIENT
Start: 2022-07-06

## 2022-07-06 RX ORDER — LOSARTAN POTASSIUM 100 MG/1
100 TABLET ORAL DAILY
Qty: 90 TABLET | Refills: 1 | Status: SHIPPED | OUTPATIENT
Start: 2022-07-06 | End: 2022-10-11

## 2022-07-06 ASSESSMENT — ENCOUNTER SYMPTOMS
CHEST TIGHTNESS: 0
DIARRHEA: 0
RHINORRHEA: 0
BACK PAIN: 0
SHORTNESS OF BREATH: 1
CONSTIPATION: 0

## 2022-08-03 NOTE — PROGRESS NOTES
Kelly Rob MD   losartan (COZAAR) 100 MG tablet Take 1 tablet by mouth daily 7/6/22  Yes Amy Mullen MD       PHYSICAL EXAM        Vitals:    08/16/22 0729   BP: (!) 160/66   Pulse: 68   Resp: 22   SpO2: 95%    Weight: 244 lb 6.4 oz (110.9 kg)     Gen Alert, cooperative, no distress Heart  Regular rate and rhythm, no murmur   Head Normocephalic, atraumatic, no abnormalities Abd  Soft, NT, +BS, no mass, no OM   Eyes PERRLA, conj/corn clear Ext  Ext nl, AT, no C/C, no edema   Nose Nares normal, no drain age, Non-tender Pulse 2+ and symmetric   Throat Lips, mucosa, tongue normal Skin Color/text/turg nl, no rash/lesions   Neck S/S, TM, NT, no bruit Psych Nl mood and affect   Lung CTA-B, unlabored, no DTP     Ch wall NT, no deform       LABS and Imaging     Relevant and available CV data reviewed    EKG personally interpreted: 8/16/22 SR with [facility]    4/10/19 Luis   Sinus rhythm   Probable left atrial enlargement   Probable anteroseptal infarct, old   Baseline wander in lead(s) V2       ModerateRisk  ModerateComplexity/Medical Decision Making    Outside/Care everywhere records Reviewed  Labs Reviewed  Prior Imaging, ekg, cath, echo reviewed when available  Medications reviewed  Old Notes reviewed  ASSESSMENT AND PLAN     SOB with exertion  No chest pain  DDx includes heart failure; at risk of CHF due to chemo in the past; no anginal symptoms  Plan: BNP, CBC, CMP, Echo    2. Pure hypercholesterolemia   7/2/22 Lipid Panel   HDL 31   Crestor 10 mg daily  Plan: Continue current meds; Repeat lipid panel in 3 months; has upcoming CT lung cancer screening, if significant coronary calcifications are seen his LDL goal would be < 70    3. HTN  BP today 160/55  Losartan 100 mg daily  Plan: Continue per PCP    4. Colon CA  Dx'd 6 yrs ago. Had chemo and rad tx, Considered cancer free. Colon cancer s/p transanal excision with recurrence followed by APR with permanent end colostomy.     5. Obesity  BMI 36.33  7/2/22 A1c 6.1  Patient counseled on lifestyle modification, diet, and exercise. Follow Up: 1 year     Braulio Hoskins MD  Cardiologist [facility]    Scribe's Attestation: This note was scribed in the presence of Dr. Joleen Bansal MD by Alessandra March RN. 08/16/22     Physician Attestation  The scribe wrote this note in the presence of me Braulio Hoskins MD). The scribe may have prepopulated components of this note with my historical  intellectual property under my direct supervision. Any additions to this intellectual property were performed in my presence and at my direction. Furthermore, the content and accuracy of this note have been reviewed by me with edits by me as needed.   Braulio Hoskins MD 8/16/2022 9:16 AM

## 2022-08-11 ENCOUNTER — OFFICE VISIT (OUTPATIENT)
Dept: ENT CLINIC | Age: 77
End: 2022-08-11
Payer: COMMERCIAL

## 2022-08-11 VITALS
SYSTOLIC BLOOD PRESSURE: 195 MMHG | OXYGEN SATURATION: 93 % | HEART RATE: 67 BPM | TEMPERATURE: 98.6 F | DIASTOLIC BLOOD PRESSURE: 82 MMHG

## 2022-08-11 DIAGNOSIS — H93.13 SUBJECTIVE TINNITUS OF BOTH EARS: Chronic | ICD-10-CM

## 2022-08-11 DIAGNOSIS — H90.3 ASYMMETRICAL SENSORINEURAL HEARING LOSS: Primary | Chronic | ICD-10-CM

## 2022-08-11 PROCEDURE — G8417 CALC BMI ABV UP PARAM F/U: HCPCS | Performed by: OTOLARYNGOLOGY

## 2022-08-11 PROCEDURE — G8427 DOCREV CUR MEDS BY ELIG CLIN: HCPCS | Performed by: OTOLARYNGOLOGY

## 2022-08-11 PROCEDURE — 1123F ACP DISCUSS/DSCN MKR DOCD: CPT | Performed by: OTOLARYNGOLOGY

## 2022-08-11 PROCEDURE — 1036F TOBACCO NON-USER: CPT | Performed by: OTOLARYNGOLOGY

## 2022-08-11 PROCEDURE — 99214 OFFICE O/P EST MOD 30 MIN: CPT | Performed by: OTOLARYNGOLOGY

## 2022-08-11 NOTE — PATIENT INSTRUCTIONS
I recommend an MRI scan of the IACs/brain, with contrast, to rule out a vestibular schwannoma (\"acoustic neuroma\") or central nervous system disease as the reason for your unilateral or asymmetric sensorineural hearing loss and tinnitus. There are adverse consequences of untreated acoustic neuroma, i.e. total loss of hearing, paralysis of the face, permanent dizziness or pressure on the brain. This may occur suddenly due to bleeding into the tumor or sudden growth. Call the office for the results of the MRI scan 10 days after the test, if you have not been informed previously. You should consider amplification therapy, hearing aid, if you are having difficulty communicating and/or hearing important sounds. I recommend bilateral hearing aids for aural rehabilitation and to aid in restoring functional hearing. You may follow up with Dr. Doyle Wolf or with another hearing aid provider of your choice. You will probably have a decrease in understanding of speech in the presence of background noise and expected difficulty understanding speech in the presence of moderate to high level of background noise. This is typical of your type of hearing loss. You should return if your ears plug up with ear wax causing difficulty hearing or pressure. Most patients who use hearing aids, will need their ears cleaned every 6 to 12 months. You may use an over the counter ear wax removal kit (such as Murine, Bausch and Lomb, NeilMed, or Debrox wax removal system) for ear wax removal, as needed. It may help to use Debrox (OTC) for 4 days prior to future visits for ear cleaning. This may soften your ear wax and facilitate removal of the wax. You should return for an annual hearing test to monitor your hearing, and whenever your hearing further decreases significantly. You should employ the tinnitus masking techniques and strategies we discussed, as needed.   Bedside tinnitus masking devices (e.g. a white noise machine, noise machine, noise karla on your cell phone, fan on in the room, radio with light music or tuned between stations to white noise static) can be very helpful. You should avoid exposure to excessively high levels of noise/sound and use hearing protection measures we discussed, as needed, if such exposure is unavoidable. I recommend that you use Lipoflavonoid Plus, (use brand name, not generic, for the first six months), for treatment of your tinnitus. This is available \"over the counter\" at your pharmacy. You should take two orally three times a day for the first 60 days, then one orally three times a day thereafter. Take this medication continuously for six months. If you have seen no improvement in your tinnitus after six months, you should consider cessation of this treatment. NO Q-TIPS IN THE EARS  You should never clean your ears with a Q-tip, cotton tipped applicator, Aimee pin, paper clip, pen cap, nail file, or any other instrument. I recommend only use of one the several ear wax removal kits available \"over the counter\" (eg. Bausch and Lomb or Murine, or The Alex-Mathew) if you feel a need to try to remove ear wax. No other methods should be self used for this purpose as there is danger of injury to the ear and risk of irreparable and irreversible permanent hearing loss and permanent dizziness.      =================================================================      EUSTACHIAN TUBE DYSFUNCTION MEASURES    Please do the following to attempt to improve your Eustachian tube dysfunction :  Auto inflate your ears as instructed ( hold your nose closed, with your mouth closed and blow out as if blowing ear into or out of ears. If not successful, then swallow while doing the auto inflation maneuver and maintaining the pressure) every three hours, while awake, for ten days, and then four times daily for 10 days, and then three times daily and as needed for ear congestion.

## 2022-08-11 NOTE — PROGRESS NOTES
[facility]       CHIEF COMPLAINT  Chief Complaint   Patient presents with    Hearing Loss    Tinnitus         HISTORY       Jenna Ramirez is a 67 y.o. male here for follow up of hearing loss and tinnitus. He stated that the tinnitus does not bother him a great deal.  It waxes and wanes and is louder at times. EXAMINATION   Tympanic membranes were nonerythematous, dull, and thickened with shallow retraction pocket bilaterally. No evidence of middle ear effusion. External auditory canals appear to be clear and normal bilaterally. AUDIOGRAM    I independently interpreted the results of the audiogram, a test performed by another healthcare provider, showing a bilateral sensorineural hearing loss asymmetric left greater than right. Speech discrimination was markedly reduced in the left ear and mildly reduced in the right ear. Speech reception threshold was markedly decreased in the left ear and moderately decreased in the right ear. This is felt to be significant for possible vestibular schwannoma. COUNSELING    Audiogram results were reviewed and discussed with Niranjan Waller. I advised of type and severity of hearing loss and the probable etiology of hearing loss of aging (presbycusis), with suspicion for left vestibular schwannoma, which needs to be ruled out. I discussed the possible associated impairment. I advised of the need for avoidance of prolonged or frequent exposure to excessive noise and the need for noise protection, if such exposure is unavoidable. I discussed the possible benefits of hearing aids, or other amplification therapy. I discussed the possible etiology of tinnitus and treatment/coping measures including masking techniques. I advised of the need for annual and prn hearing tests.   Patient was advised of the greater decrease in word and speech understanding in the presence of background Rewarming initiated at 1000 per MD Susu. Will follow order set for rewarming protocol and labs. Will continue to monitor closely.   noise and of the expected difficulty understanding speech in the presence of moderate to high volume background noise associated with this hearing loss. I discussed acoustic neuroma (vestibular schwannoma), signs, symptoms, possible progression with loss of hearing, dizziness, facial nerve paralysis and compression of brain stem, and the need for an MRI scan of the IACs/brain to investigate for this entity. Rosanne Frias / Jonas You / Jennifer Vital / Malou Castle was seen today for hearing loss and tinnitus. Diagnoses and all orders for this visit:    Asymmetrical sensorineural hearing loss  Comments:  Left greater than right with marked decrease in speech reception threshold and word discrimination score, suspicious for vestibular schwannoma. Orders:  -     MRI IAC POSTERIOR FOSSA W WO CONTRAST    Subjective tinnitus of both ears         RECOMMENDATIONS / PLAN    Hearing aid evaluation. See patient instructions, on file for this visit, which were fully discussed with the patient. Return in about 6 months (around 2/11/2023) for recheck/follow-up, ear cleaning, and sooner if condition worsens.

## 2022-08-12 ENCOUNTER — TELEPHONE (OUTPATIENT)
Dept: ENT CLINIC | Age: 77
End: 2022-08-12

## 2022-08-12 ENCOUNTER — OFFICE VISIT (OUTPATIENT)
Dept: AUDIOLOGY | Age: 77
End: 2022-08-12

## 2022-08-12 DIAGNOSIS — H90.A32 MIXED CONDUCTIVE AND SENSORINEURAL HEARING LOSS OF LEFT EAR WITH RESTRICTED HEARING OF RIGHT EAR: Primary | ICD-10-CM

## 2022-08-12 PROCEDURE — 99999 PR OFFICE/OUTPT VISIT,PROCEDURE ONLY: CPT | Performed by: AUDIOLOGIST

## 2022-08-12 NOTE — TELEPHONE ENCOUNTER
Patient wanting to know if the hearing aids Dr. Carolann Best recommended him come with coverage for loss or damages.

## 2022-08-15 PROBLEM — E66.09 CLASS 2 OBESITY DUE TO EXCESS CALORIES WITHOUT SERIOUS COMORBIDITY IN ADULT: Status: ACTIVE | Noted: 2022-08-15

## 2022-08-15 PROBLEM — E66.812 CLASS 2 OBESITY DUE TO EXCESS CALORIES WITHOUT SERIOUS COMORBIDITY IN ADULT: Status: ACTIVE | Noted: 2022-08-15

## 2022-08-15 PROBLEM — R06.02 SOB (SHORTNESS OF BREATH): Status: ACTIVE | Noted: 2022-08-15

## 2022-08-16 ENCOUNTER — OFFICE VISIT (OUTPATIENT)
Dept: CARDIOLOGY CLINIC | Age: 77
End: 2022-08-16
Payer: COMMERCIAL

## 2022-08-16 ENCOUNTER — HOSPITAL ENCOUNTER (OUTPATIENT)
Age: 77
Discharge: HOME | End: 2022-08-16
Payer: COMMERCIAL

## 2022-08-16 VITALS
BODY MASS INDEX: 36.2 KG/M2 | HEIGHT: 69 IN | WEIGHT: 244.4 LBS | SYSTOLIC BLOOD PRESSURE: 160 MMHG | OXYGEN SATURATION: 95 % | RESPIRATION RATE: 22 BRPM | DIASTOLIC BLOOD PRESSURE: 66 MMHG | HEART RATE: 68 BPM

## 2022-08-16 DIAGNOSIS — I10 PRIMARY HYPERTENSION: ICD-10-CM

## 2022-08-16 DIAGNOSIS — R06.02 SOB (SHORTNESS OF BREATH): ICD-10-CM

## 2022-08-16 DIAGNOSIS — E78.00 PURE HYPERCHOLESTEROLEMIA: ICD-10-CM

## 2022-08-16 DIAGNOSIS — R06.02 SOB (SHORTNESS OF BREATH): Primary | ICD-10-CM

## 2022-08-16 DIAGNOSIS — E66.09 CLASS 2 OBESITY DUE TO EXCESS CALORIES WITHOUT SERIOUS COMORBIDITY WITH BODY MASS INDEX (BMI) OF 36.0 TO 36.9 IN ADULT: ICD-10-CM

## 2022-08-16 LAB
A/G RATIO: 1.4 (ref 1.1–2.2)
ALBUMIN SERPL-MCNC: 4.2 G/DL (ref 3.4–5)
ALP BLD-CCNC: 62 U/L (ref 40–129)
ALT SERPL-CCNC: 19 U/L (ref 10–40)
ANION GAP SERPL CALCULATED.3IONS-SCNC: 9 MMOL/L (ref 3–16)
AST SERPL-CCNC: 22 U/L (ref 15–37)
BILIRUB SERPL-MCNC: 0.4 MG/DL (ref 0–1)
BUN BLDV-MCNC: 13 MG/DL (ref 7–20)
CALCIUM SERPL-MCNC: 9.3 MG/DL (ref 8.3–10.6)
CHLORIDE BLD-SCNC: 106 MMOL/L (ref 99–110)
CO2: 24 MMOL/L (ref 21–32)
CREAT SERPL-MCNC: 0.9 MG/DL (ref 0.8–1.3)
GFR AFRICAN AMERICAN: >60
GFR NON-AFRICAN AMERICAN: >60
GLUCOSE BLD-MCNC: 105 MG/DL (ref 70–99)
HCT VFR BLD CALC: 41.8 % (ref 40.5–52.5)
HEMOGLOBIN: 14 G/DL (ref 13.5–17.5)
MCH RBC QN AUTO: 29.7 PG (ref 26–34)
MCHC RBC AUTO-ENTMCNC: 33.5 G/DL (ref 31–36)
MCV RBC AUTO: 88.6 FL (ref 80–100)
PDW BLD-RTO: 14.5 % (ref 12.4–15.4)
PLATELET # BLD: 129 K/UL (ref 135–450)
PMV BLD AUTO: 9.7 FL (ref 5–10.5)
POTASSIUM SERPL-SCNC: 4.7 MMOL/L (ref 3.5–5.1)
PRO-BNP: 281 PG/ML (ref 0–449)
RBC # BLD: 4.71 M/UL (ref 4.2–5.9)
SODIUM BLD-SCNC: 139 MMOL/L (ref 136–145)
TOTAL PROTEIN: 7.1 G/DL (ref 6.4–8.2)
WBC # BLD: 5.9 K/UL (ref 4–11)

## 2022-08-16 PROCEDURE — G8427 DOCREV CUR MEDS BY ELIG CLIN: HCPCS | Performed by: INTERNAL MEDICINE

## 2022-08-16 PROCEDURE — G8417 CALC BMI ABV UP PARAM F/U: HCPCS | Performed by: INTERNAL MEDICINE

## 2022-08-16 PROCEDURE — 1123F ACP DISCUSS/DSCN MKR DOCD: CPT | Performed by: INTERNAL MEDICINE

## 2022-08-16 PROCEDURE — 85027 COMPLETE CBC AUTOMATED: CPT

## 2022-08-16 PROCEDURE — 83880 ASSAY OF NATRIURETIC PEPTIDE: CPT

## 2022-08-16 PROCEDURE — 99204 OFFICE O/P NEW MOD 45 MIN: CPT | Performed by: INTERNAL MEDICINE

## 2022-08-16 PROCEDURE — 93000 ELECTROCARDIOGRAM COMPLETE: CPT | Performed by: INTERNAL MEDICINE

## 2022-08-16 PROCEDURE — 1036F TOBACCO NON-USER: CPT | Performed by: INTERNAL MEDICINE

## 2022-08-16 PROCEDURE — 36415 COLL VENOUS BLD VENIPUNCTURE: CPT

## 2022-08-16 PROCEDURE — 80053 COMPREHEN METABOLIC PANEL: CPT

## 2022-08-16 NOTE — TELEPHONE ENCOUNTER
[phone] ([place])   Left a voicemail for patient letting him know about the message  sent regarding his hearing aids .

## 2022-08-18 ENCOUNTER — TELEPHONE (OUTPATIENT)
Dept: CARDIOLOGY CLINIC | Age: 77
End: 2022-08-18

## 2022-08-18 NOTE — TELEPHONE ENCOUNTER
Dr Jana Solorzano reviewed lab results. Labs are good. Still plan for echo in Sept as scheduled. Left message on VM. Asked to call office back to discuss.

## 2022-08-24 ENCOUNTER — HOSPITAL ENCOUNTER (OUTPATIENT)
Dept: MRI IMAGING | Age: 77
Discharge: HOME | End: 2022-08-24
Payer: MEDICARE

## 2022-08-24 DIAGNOSIS — H90.3 ASYMMETRICAL SENSORINEURAL HEARING LOSS: Chronic | ICD-10-CM

## 2022-08-24 PROCEDURE — 2580000003 HC RX 258: Performed by: OTOLARYNGOLOGY

## 2022-08-24 PROCEDURE — 70553 MRI BRAIN STEM W/O & W/DYE: CPT

## 2022-08-24 PROCEDURE — A9577 INJ MULTIHANCE: HCPCS | Performed by: OTOLARYNGOLOGY

## 2022-08-24 PROCEDURE — 6360000004 HC RX CONTRAST MEDICATION: Performed by: OTOLARYNGOLOGY

## 2022-08-24 RX ORDER — SODIUM CHLORIDE 0.9 % (FLUSH) 0.9 %
10 SYRINGE (ML) INJECTION PRN
Status: DISCONTINUED | OUTPATIENT
Start: 2022-08-24 | End: 2022-08-25 | Stop reason: HOSPADM

## 2022-08-24 RX ADMIN — Medication 10 ML: at 10:13

## 2022-08-24 RX ADMIN — GADOBENATE DIMEGLUMINE 20 ML: 529 INJECTION, SOLUTION INTRAVENOUS at 10:13

## 2022-08-24 NOTE — TELEPHONE ENCOUNTER
Call placed to patient who was not available for message below. Let detailed message on vm per hippa. Patient was encouraged to call the office for details.

## 2022-09-09 ENCOUNTER — HOSPITAL ENCOUNTER (OUTPATIENT)
Dept: NON INVASIVE DIAGNOSTICS | Age: 77
Discharge: HOME | End: 2022-09-09
Payer: COMMERCIAL

## 2022-09-09 DIAGNOSIS — R06.02 SOB (SHORTNESS OF BREATH): ICD-10-CM

## 2022-09-09 LAB
LV EF: 60 %
LVEF MODALITY: NORMAL

## 2022-09-09 PROCEDURE — C8929 TTE W OR WO FOL WCON,DOPPLER: HCPCS

## 2022-09-09 PROCEDURE — 6360000004 HC RX CONTRAST MEDICATION: Performed by: INTERNAL MEDICINE

## 2022-09-09 RX ADMIN — PERFLUTREN 1.65 MG: 6.52 INJECTION, SUSPENSION INTRAVENOUS at 13:42

## 2022-09-12 ENCOUNTER — PATIENT MESSAGE (OUTPATIENT)
Dept: CARDIOLOGY CLINIC | Age: 77
End: 2022-09-12

## 2022-09-12 ENCOUNTER — TELEPHONE (OUTPATIENT)
Dept: CARDIOLOGY CLINIC | Age: 77
End: 2022-09-12

## 2022-09-12 DIAGNOSIS — I51.89 DIASTOLIC DYSFUNCTION: Primary | ICD-10-CM

## 2022-09-12 NOTE — TELEPHONE ENCOUNTER
----- Message from Garth Rowe MD sent at 9/12/2022 10:02 AM EDT -----  Echo shows mild diastolic dysfunction  Let's start Jardiance 10 mg and also get a BMP and BNP

## 2022-09-12 NOTE — TELEPHONE ENCOUNTER
I attempted to call Mr Madhavi Hamilton per Dr Yanna Preciado message, there was no answer. LMOM for patient to call back so we may go over his echo results and Dr Yanna Preciado recommendations.

## 2022-09-12 NOTE — TELEPHONE ENCOUNTER
I spoke with pt . He stated that he is agreeable to starting new med and getting his blood work. Placed lab orders and pended script.

## 2022-09-14 ENCOUNTER — HOSPITAL ENCOUNTER (OUTPATIENT)
Age: 77
Discharge: HOME | End: 2022-09-14
Payer: COMMERCIAL

## 2022-09-14 DIAGNOSIS — I51.89 DIASTOLIC DYSFUNCTION: ICD-10-CM

## 2022-09-14 LAB
ANION GAP SERPL CALCULATED.3IONS-SCNC: 9 MMOL/L (ref 3–16)
BUN BLDV-MCNC: 16 MG/DL (ref 7–20)
CALCIUM SERPL-MCNC: 9.2 MG/DL (ref 8.3–10.6)
CHLORIDE BLD-SCNC: 106 MMOL/L (ref 99–110)
CO2: 25 MMOL/L (ref 21–32)
CREAT SERPL-MCNC: 0.8 MG/DL (ref 0.8–1.3)
GFR AFRICAN AMERICAN: >60
GFR NON-AFRICAN AMERICAN: >60
GLUCOSE BLD-MCNC: 108 MG/DL (ref 70–99)
POTASSIUM SERPL-SCNC: 4.6 MMOL/L (ref 3.5–5.1)
PRO-BNP: 283 PG/ML (ref 0–449)
SODIUM BLD-SCNC: 140 MMOL/L (ref 136–145)

## 2022-09-14 PROCEDURE — 80048 BASIC METABOLIC PNL TOTAL CA: CPT

## 2022-09-14 PROCEDURE — 36415 COLL VENOUS BLD VENIPUNCTURE: CPT

## 2022-09-14 PROCEDURE — 83880 ASSAY OF NATRIURETIC PEPTIDE: CPT

## 2022-09-14 NOTE — TELEPHONE ENCOUNTER
From: Violette Schaefer  To: Dr. Esmer Light  Sent: 9/12/2022 6:29 PM EDT  Subject: Cherwendy Tamezs Medication    Hello. I was told by your nurse that you want to prescribe Jardiance at 10 mg. I checked with my insurance provider and they told me my copay for a 90 day supply would be ~$475. I am on a fixed income and I cannot afford that. Is there any alternative?   Celeste Ibarra

## 2022-09-15 NOTE — TELEPHONE ENCOUNTER
Pt called to inform Camilo Hanson that his insurance company does not cover the [place] or [address] [place] [address]. Please call to discuss.   Please advise

## 2022-09-15 NOTE — TELEPHONE ENCOUNTER
Patient is return call, he states that he has Medicare Advantage. He was given the spellings of both medications so that he can check pricing. He is also asking if he could speak with Dr Cabrera Lazo or Mrs Kali Patel regarding recent lab results. Thanks.

## 2022-09-16 ENCOUNTER — TELEPHONE (OUTPATIENT)
Dept: CARDIOLOGY CLINIC | Age: 77
End: 2022-09-16

## 2022-09-16 NOTE — TELEPHONE ENCOUNTER
Call placed to patient who was not available for message below. Per Carmen Celestin left results on vm.

## 2022-09-16 NOTE — TELEPHONE ENCOUNTER
Called and left message for pt that paperwork for Jardiance [facility]) is ready for him to  and Dr. Preeti Uribe will sign his portion. Also left message that blood work is normal per [facility].

## 2022-09-16 NOTE — TELEPHONE ENCOUNTER
Called pt back to inform him there is a patient assistance program for Jardiance and he may be approved for Free drug. I will print and set paperwork in the front office. He was advised on message to pick it up and complete his portion for Patient Assistance. Left message for pt with this information.

## 2022-09-16 NOTE — TELEPHONE ENCOUNTER
Left patient a message regarding where he wants Jardiance sent to and to review lab results as requested.

## 2022-10-07 ENCOUNTER — HOSPITAL ENCOUNTER (OUTPATIENT)
Age: 77
Discharge: HOME | End: 2022-10-07
Payer: COMMERCIAL

## 2022-10-07 DIAGNOSIS — E78.00 PURE HYPERCHOLESTEROLEMIA: ICD-10-CM

## 2022-10-07 LAB
A/G RATIO: 1.7 (ref 1.1–2.2)
ALBUMIN SERPL-MCNC: 4.2 G/DL (ref 3.4–5)
ALP BLD-CCNC: 64 U/L (ref 40–129)
ALT SERPL-CCNC: 17 U/L (ref 10–40)
ANION GAP SERPL CALCULATED.3IONS-SCNC: 8 MMOL/L (ref 3–16)
AST SERPL-CCNC: 22 U/L (ref 15–37)
BILIRUB SERPL-MCNC: 0.4 MG/DL (ref 0–1)
BUN BLDV-MCNC: 14 MG/DL (ref 7–20)
CALCIUM SERPL-MCNC: 9 MG/DL (ref 8.3–10.6)
CHLORIDE BLD-SCNC: 107 MMOL/L (ref 99–110)
CHOLESTEROL, FASTING: 131 MG/DL (ref 0–199)
CO2: 24 MMOL/L (ref 21–32)
CREAT SERPL-MCNC: 0.8 MG/DL (ref 0.8–1.3)
GFR AFRICAN AMERICAN: >60
GFR NON-AFRICAN AMERICAN: >60
GLUCOSE FASTING: 102 MG/DL (ref 70–99)
HDLC SERPL-MCNC: 34 MG/DL (ref 40–60)
LDL CHOLESTEROL CALCULATED: 78 MG/DL
POTASSIUM SERPL-SCNC: 4.6 MMOL/L (ref 3.5–5.1)
SODIUM BLD-SCNC: 139 MMOL/L (ref 136–145)
TOTAL PROTEIN: 6.7 G/DL (ref 6.4–8.2)
TRIGLYCERIDE, FASTING: 95 MG/DL (ref 0–150)
VLDLC SERPL CALC-MCNC: 19 MG/DL

## 2022-10-07 PROCEDURE — 36415 COLL VENOUS BLD VENIPUNCTURE: CPT

## 2022-10-07 PROCEDURE — 80053 COMPREHEN METABOLIC PANEL: CPT

## 2022-10-07 PROCEDURE — 80061 LIPID PANEL: CPT

## 2022-10-09 NOTE — PROGRESS NOTES
SUBJECTIVE:    Kurt Rod is a 67 y.o. male who presents for a follow up visit. Chief Complaint   Patient presents with    Follow-up     Patient is here for a follow up. No new concerns. Hypertension  This is a chronic problem. The current episode started more than 1 year ago. The problem is controlled. Pertinent negatives include no chest pain or shortness of breath. Risk factors for coronary artery disease include male gender, obesity, sedentary lifestyle and dyslipidemia. Past treatments include angiotensin blockers. The current treatment provides significant improvement. Compliance problems include exercise and diet. Hypertensive end-organ damage includes heart failure (diastolic with preserved ej fx). Hyperlipidemia  This is a chronic problem. The current episode started more than 1 year ago. Lipid results: Total cholesterol 131, triglycerides 95, HDL 34, LDL 78. Exacerbating diseases include obesity. Pertinent negatives include no chest pain or shortness of breath. Current antihyperlipidemic treatment includes statins. The current treatment provides significant improvement of lipids. Compliance problems include adherence to exercise and adherence to diet. Risk factors for coronary artery disease include dyslipidemia, male sex, obesity, a sedentary lifestyle and hypertension. Congestive Heart Failure  Presents for follow-up visit. Associated symptoms include edema. Pertinent negatives include no chest pain, fatigue or shortness of breath. The symptoms have been stable. Compliance with total regimen is %. Compliance problems include adherence to exercise and adherence to diet. Compliance with diet is 51-75%. Compliance with exercise is 26-50%. Compliance with medications is %. Patient states that the cardiologist started him on Jardiance but he has yet to get the prescription started because it says it may cause dizziness.   He states he does plan to start it soon.      Patient's medications, allergies, past medical,surgical, social and family histories were reviewed and updated as appropriate. Past Medical History:   Diagnosis Date    Cancer ([address]     rectal and bladder    Hyperlipidemia     borderline-no meds    Hypertension     Tobacco use disorder     Ventral hernia 2017    Villous adenoma      Past Surgical History:   Procedure Laterality Date    ABDOMEN SURGERY  2016    ap resection for rectal cancer    CERVICAL DISC ARTHROPLASTY  2015    C5    COLONOSCOPY  14    with ultrasound, biopsy taken    COLONOSCOPY N/A 10/25/2019    COLONOSCOPY POLYPECTOMY REMOVAL HOT BIOPSY/STOMA performed by Susanne Knapp MD at [address] [place] [address] bladder tumor    [facility] ARTHROSCOPY Left     OTHER SURGICAL HISTORY      EUS with fine needle biopsy of lymph node 11    OTHER SURGICAL HISTORY Left 6/29/15    left port a cath placement    RECTAL SURGERY      for cancer    SHOULDER ARTHROSCOPY       Family History   Problem Relation Age of Onset    Lung Cancer Mother     Early Death Father     Diabetes Sister     Diabetes Brother      Social History     Tobacco Use    Smoking status: Former     Packs/day: 1.00     Years: 40.00     Pack years: 40.00     Types: Cigarettes     Quit date: 2016     Years since quittin.7    Smokeless tobacco: Never    Tobacco comments:     quit 2016   Substance Use Topics    Alcohol use:  Yes     Alcohol/week: 0.0 standard drinks     Types: 2 Cans of beer per week     Comment: rare      Allergies   Allergen Reactions    Lisinopril Other (See Comments)     Itchy throat with swelling-difficulty swallowing     Current Outpatient Medications on File Prior to Visit   Medication Sig Dispense Refill    rosuvastatin (CRESTOR) 10 MG tablet Take 1 tablet by mouth nightly 30 tablet 3    empagliflozin (JARDIANCE) 10 MG tablet Take 1 tablet by mouth daily (Patient not taking: Reported on 10/11/2022) 90 tablet 3     No current facility-administered medications on file prior to visit. Review of Systems   Constitutional:  Negative for activity change and fatigue. HENT:  Negative for congestion, postnasal drip and rhinorrhea. Respiratory:  Negative for chest tightness and shortness of breath. Cardiovascular:  Negative for chest pain and leg swelling. Gastrointestinal:  Negative for constipation and diarrhea. Genitourinary:  Negative for difficulty urinating. Musculoskeletal:  Negative for arthralgias and back pain. Neurological:  Negative for dizziness and light-headedness. Psychiatric/Behavioral:  Negative for dysphoric mood and sleep disturbance. The patient is not nervous/anxious. OBJECTIVE:    BP (!) 153/76   Temp 97.2 °F (36.2 °C)   Wt 242 lb (109.8 kg)   BMI 35.74 kg/m²   Vitals:    10/11/22 0728 10/11/22 0740   BP: (!) 146/80 (!) 153/76   Temp: 97.2 °F (36.2 °C)    Weight: 242 lb (109.8 kg)          Physical Exam  Constitutional:       Appearance: He is well-developed. HENT:      Head: Normocephalic and atraumatic. Right Ear: External ear normal.      Left Ear: External ear normal.      Nose: Nose normal.   Eyes:      General:         Right eye: No discharge. Conjunctiva/sclera: Conjunctivae normal.   Neck:      Thyroid: No thyromegaly. Vascular: No JVD. Trachea: No tracheal deviation. Cardiovascular:      Rate and Rhythm: Normal rate and regular rhythm. Heart sounds: Normal heart sounds. Pulmonary:      Effort: Pulmonary effort is normal. No respiratory distress. Breath sounds: Normal breath sounds. No rales. Musculoskeletal:      Cervical back: Normal range of motion and neck supple. Lymphadenopathy:      Cervical: No cervical adenopathy. Skin:     General: Skin is warm and dry. Neurological:      Mental Status: He is alert and oriented to person, place, and time. ASSESSMENT/PLAN:    [address] was seen today for follow-up.     Diagnoses and all orders for this visit:    Pure hypercholesterolemia  LDL is at goal of less than 100 with a value of 78-     Comprehensive Metabolic Panel, Fasting; Future  -     Lipid, Fasting; Future  -     CBC with Auto Differential; Future  -     TSH with Reflex; Future    Primary hypertension  Blood pressure in need of better control. We will discontinue the Cozaar and start Avapro. -     irbesartan (AVAPRO) 150 MG tablet; Take 1 tablet by mouth daily    Hyperglycemia  -     Hemoglobin A1C; Future      Return in about 6 months (around 4/11/2023) for and 6 weeks for follow up HTN. Please note portions of this note were completed with a voicerecognition program.  Efforts were made to edit the dictations but occasionally words are mis-transcribed.

## 2022-10-11 ENCOUNTER — OFFICE VISIT (OUTPATIENT)
Dept: FAMILY MEDICINE CLINIC | Age: 77
End: 2022-10-11
Payer: COMMERCIAL

## 2022-10-11 VITALS
DIASTOLIC BLOOD PRESSURE: 76 MMHG | BODY MASS INDEX: 35.74 KG/M2 | TEMPERATURE: 97.2 F | WEIGHT: 242 LBS | SYSTOLIC BLOOD PRESSURE: 153 MMHG

## 2022-10-11 DIAGNOSIS — I10 PRIMARY HYPERTENSION: ICD-10-CM

## 2022-10-11 DIAGNOSIS — R73.9 HYPERGLYCEMIA: ICD-10-CM

## 2022-10-11 DIAGNOSIS — E78.00 PURE HYPERCHOLESTEROLEMIA: Primary | ICD-10-CM

## 2022-10-11 PROCEDURE — 1123F ACP DISCUSS/DSCN MKR DOCD: CPT | Performed by: FAMILY MEDICINE

## 2022-10-11 PROCEDURE — G8427 DOCREV CUR MEDS BY ELIG CLIN: HCPCS | Performed by: FAMILY MEDICINE

## 2022-10-11 PROCEDURE — G8484 FLU IMMUNIZE NO ADMIN: HCPCS | Performed by: FAMILY MEDICINE

## 2022-10-11 PROCEDURE — 1036F TOBACCO NON-USER: CPT | Performed by: FAMILY MEDICINE

## 2022-10-11 PROCEDURE — 99214 OFFICE O/P EST MOD 30 MIN: CPT | Performed by: FAMILY MEDICINE

## 2022-10-11 PROCEDURE — G8417 CALC BMI ABV UP PARAM F/U: HCPCS | Performed by: FAMILY MEDICINE

## 2022-10-11 RX ORDER — IRBESARTAN 150 MG/1
150 TABLET ORAL DAILY
Qty: 30 TABLET | Refills: 3 | Status: SHIPPED | OUTPATIENT
Start: 2022-10-11

## 2022-10-11 ASSESSMENT — ENCOUNTER SYMPTOMS
BACK PAIN: 0
CONSTIPATION: 0
DIARRHEA: 0
CHEST TIGHTNESS: 0
RHINORRHEA: 0
SHORTNESS OF BREATH: 0

## 2022-10-26 SDOH — HEALTH STABILITY: PHYSICAL HEALTH: ON AVERAGE, HOW MANY MINUTES DO YOU ENGAGE IN EXERCISE AT THIS LEVEL?: 30 MIN

## 2022-10-26 SDOH — HEALTH STABILITY: PHYSICAL HEALTH: ON AVERAGE, HOW MANY DAYS PER WEEK DO YOU ENGAGE IN MODERATE TO STRENUOUS EXERCISE (LIKE A BRISK WALK)?: 3 DAYS

## 2022-10-26 ASSESSMENT — LIFESTYLE VARIABLES
HOW MANY STANDARD DRINKS CONTAINING ALCOHOL DO YOU HAVE ON A TYPICAL DAY: 1 OR 2
HOW OFTEN DO YOU HAVE SIX OR MORE DRINKS ON ONE OCCASION: 1
HOW OFTEN DO YOU HAVE A DRINK CONTAINING ALCOHOL: MONTHLY OR LESS
HOW OFTEN DO YOU HAVE A DRINK CONTAINING ALCOHOL: 2
HOW MANY STANDARD DRINKS CONTAINING ALCOHOL DO YOU HAVE ON A TYPICAL DAY: 1

## 2022-10-26 ASSESSMENT — PATIENT HEALTH QUESTIONNAIRE - PHQ9
SUM OF ALL RESPONSES TO PHQ QUESTIONS 1-9: 1
SUM OF ALL RESPONSES TO PHQ QUESTIONS 1-9: 1
2. FEELING DOWN, DEPRESSED OR HOPELESS: 0
SUM OF ALL RESPONSES TO PHQ9 QUESTIONS 1 & 2: 1
1. LITTLE INTEREST OR PLEASURE IN DOING THINGS: 1
SUM OF ALL RESPONSES TO PHQ QUESTIONS 1-9: 1
SUM OF ALL RESPONSES TO PHQ QUESTIONS 1-9: 1

## 2022-11-01 NOTE — PROGRESS NOTES
SUBJECTIVE:    Tin Solitario is a 67 y.o. male who presents for a follow up visit. Chief Complaint   Patient presents with    Medicare AW        Hypertension  This is a chronic problem. The current episode started more than 1 year ago. The problem has been waxing and waning since onset. The problem is controlled. Pertinent negatives include no chest pain or shortness of breath. Risk factors for coronary artery disease include dyslipidemia, male gender, obesity and sedentary lifestyle. Past treatments include angiotensin blockers (Recently switched from losartan to irbesartan for better control). The current treatment provides moderate improvement. Compliance problems include exercise and diet. Patient's medications, allergies, past medical,surgical, social and family histories were reviewed and updated as appropriate.      Past Medical History:   Diagnosis Date    Cancer ([facility] [address]     rectal and bladder    Hyperlipidemia     borderline-no meds    Hypertension     Tobacco use disorder     Ventral hernia 04/26/2017    Villous adenoma      Past Surgical History:   Procedure Laterality Date    ABDOMEN SURGERY  1/2016    ap resection for rectal cancer    CERVICAL DISC ARTHROPLASTY  2015    C5    COLONOSCOPY  4/2/14    with ultrasound, biopsy taken    COLONOSCOPY N/A 10/25/2019    COLONOSCOPY POLYPECTOMY REMOVAL HOT BIOPSY/STOMA performed by Geovanni Farias MD at [facility] bladder tumor    [facility] ARTHROSCOPY Left     OTHER SURGICAL HISTORY      EUS with fine needle biopsy of lymph node 6/8/11    OTHER SURGICAL HISTORY Left 6/29/15    left port a cath placement    RECTAL SURGERY      for cancer    SHOULDER ARTHROSCOPY       Family History   Problem Relation Age of Onset    Lung Cancer Mother     Early Death Father     Diabetes Sister     Diabetes Brother      Social History     Tobacco Use    Smoking status: Former     Packs/day: 1.00     Years: 40.00     Pack years: 40. 00     Types: Cigarettes     Quit date: 2016     Years since quittin.8    Smokeless tobacco: Never    Tobacco comments:     quit 2016   Substance Use Topics    Alcohol use: Yes     Alcohol/week: 0.0 standard drinks     Types: 2 Cans of beer per week     Comment: rare      Allergies   Allergen Reactions    Lisinopril Other (See Comments)     Itchy throat with swelling-difficulty swallowing     Current Outpatient Medications on File Prior to Visit   Medication Sig Dispense Refill    irbesartan (AVAPRO) 150 MG tablet Take 1 tablet by mouth daily 30 tablet 3    empagliflozin (JARDIANCE) 10 MG tablet Take 1 tablet by mouth daily 90 tablet 3    rosuvastatin (CRESTOR) 10 MG tablet Take 1 tablet by mouth nightly 30 tablet 3     No current facility-administered medications on file prior to visit. Review of Systems   Constitutional:  Negative for activity change and fatigue. HENT:  Negative for congestion, postnasal drip and rhinorrhea. Respiratory:  Negative for shortness of breath. Cardiovascular:  Negative for chest pain. Gastrointestinal:  Negative for constipation and diarrhea. OBJECTIVE:    BP (!) 174/80   Ht 5' 9\" (1.753 m)   Wt 239 lb (108.4 kg)   BMI 35.29 kg/m²    Vitals:    22 1405 22 1432   BP: (!) 160/80 (!) 174/80   Weight: 239 lb (108.4 kg)    Height: 5' 9\" (1.753 m)       Physical Exam  Constitutional:       General: He is not in acute distress. Appearance: He is well-developed. He is obese. HENT:      Head: Normocephalic and atraumatic. Right Ear: Tympanic membrane and external ear normal.      Left Ear: Tympanic membrane and external ear normal.      Nose: Nose normal.      Mouth/Throat:      Mouth: Mucous membranes are moist.      Pharynx: No posterior oropharyngeal erythema. Eyes:      General:         Right eye: No discharge. Conjunctiva/sclera: Conjunctivae normal.   Neck:      Thyroid: No thyromegaly. Vascular: No JVD. Trachea: No tracheal deviation. Cardiovascular:      Rate and Rhythm: Normal rate and regular rhythm. Heart sounds: Normal heart sounds. Pulmonary:      Effort: Pulmonary effort is normal. No respiratory distress. Breath sounds: Normal breath sounds. No rales. Musculoskeletal:      Cervical back: Normal range of motion and neck supple. Right lower leg: No edema. Left lower leg: No edema. Lymphadenopathy:      Cervical: No cervical adenopathy. Skin:     General: Skin is warm and dry. Neurological:      General: No focal deficit present. Mental Status: He is alert and oriented to person, place, and time. Psychiatric:         Mood and Affect: Mood normal.         Behavior: Behavior normal.       ASSESSMENT/PLAN:    Mansi Washington was seen today for medicare awv. Diagnoses and all orders for this visit:    Pure hypercholesterolemia  Continue current medications    Primary hypertension  In need of better control. Patient states that he has a history of whitecoat hypertension. I have asked him to obtain a new blood pressure monitor and start monitoring his blood pressures at home. He does have an appointment scheduled for the 22nd. I have asked him to bring his monitor with a list of his readings and will decide if he needs an increase medication. Medicare annual wellness visit, subsequent  AWV done today    Return for Medicare Annual Wellness Visit in 1 year, regularly scheduled follow-up. Please note portions of this note were completed with a voicerecognition program.  Efforts were made to edit the dictations but occasionally words are mis-transcribed.   Medicare Annual Wellness Visit    Park Espinoza is here for Medicare AWV    Assessment & Plan   Pure hypercholesterolemia  Primary hypertension  Medicare annual wellness visit, subsequent    Recommendations for Preventive Services Due: see orders and patient instructions/AVS.  Recommended screening schedule for the next 5-10 years is provided to the patient in written form: see Patient Instructions/AVS.     Return for Medicare Annual Wellness Visit in 1 year, regularly scheduled follow-up. Subjective       Patient's complete Health Risk Assessment and screening values have been reviewed and are found in Flowsheets. The following problems were reviewed today and where indicated follow up appointments were made and/or referrals ordered. Positive Risk Factor Screenings with Interventions:             General Health and ACP:  General  In general, how would you say your health is?: Good  In the past 7 days, have you experienced any of the following: New or Increased Pain, New or Increased Fatigue, Loneliness, Social Isolation, Stress or Anger?: No  Do you get the social and emotional support that you need?: Yes  Do you have a Living Will?: (!) No    Advance Directives       Power of  Living Will ACP-Advance Directive ACP-Power of     Not on File Not on File Not on File Not on File        General Health Risk Interventions:  No Living Will: Advance Care Planning addressed with patient today    Health Habits/Nutrition:  Physical Activity: Inactive    Days of Exercise per Week: 0 days    Minutes of Exercise per Session: 30 min     Have you lost any weight without trying in the past 3 months?: No  Body mass index: (!) 35.29  Have you seen the dentist within the past year?: Appointment is scheduled  Health Habits/Nutrition Interventions:  Nutritional issues:  patient is not ready to address his/her nutritional/weight issues at this time             Objective   Vitals:    11/02/22 1405 11/02/22 1432   BP: (!) 160/80 (!) 174/80   Weight: 239 lb (108.4 kg)    Height: 5' 9\" (1.753 m)       Body mass index is 35.29 kg/m². Allergies   Allergen Reactions    Lisinopril Other (See Comments)     Itchy throat with swelling-difficulty swallowing     Prior to Visit Medications    Medication Sig Taking?  Authorizing Provider irbesartan (AVAPRO) 150 MG tablet Take 1 tablet by mouth daily Yes Suraj Schilling MD   empagliflozin (JARDIANCE) 10 MG tablet Take 1 tablet by mouth daily Yes Govind Higuera MD   rosuvastatin (CRESTOR) 10 MG tablet Take 1 tablet by mouth nightly Yes Suraj Schilling MD       CareTeam (Including outside providers/suppliers regularly involved in providing care):   Patient Care Team:  Suraj Schilling MD as PCP - General (Family Medicine)  Suraj Schilling MD as PCP - [facility]  Quinn Mendoza MD as Consulting Physician (Hematology and Oncology)  Garry Batista MD as Surgeon (General Surgery)     Reviewed and updated this visit:  Tobacco  Allergies  Meds  Med Hx  Surg Hx  Soc Hx  Fam Hx

## 2022-11-02 ENCOUNTER — OFFICE VISIT (OUTPATIENT)
Dept: FAMILY MEDICINE CLINIC | Age: 77
End: 2022-11-02
Payer: COMMERCIAL

## 2022-11-02 VITALS
HEIGHT: 69 IN | WEIGHT: 239 LBS | BODY MASS INDEX: 35.4 KG/M2 | SYSTOLIC BLOOD PRESSURE: 174 MMHG | DIASTOLIC BLOOD PRESSURE: 80 MMHG

## 2022-11-02 DIAGNOSIS — E78.00 PURE HYPERCHOLESTEROLEMIA: Primary | ICD-10-CM

## 2022-11-02 DIAGNOSIS — I10 PRIMARY HYPERTENSION: ICD-10-CM

## 2022-11-02 DIAGNOSIS — Z00.00 MEDICARE ANNUAL WELLNESS VISIT, SUBSEQUENT: ICD-10-CM

## 2022-11-02 PROCEDURE — 99213 OFFICE O/P EST LOW 20 MIN: CPT | Performed by: FAMILY MEDICINE

## 2022-11-02 PROCEDURE — 1123F ACP DISCUSS/DSCN MKR DOCD: CPT | Performed by: FAMILY MEDICINE

## 2022-11-02 PROCEDURE — 3074F SYST BP LT 130 MM HG: CPT | Performed by: FAMILY MEDICINE

## 2022-11-02 PROCEDURE — 3078F DIAST BP <80 MM HG: CPT | Performed by: FAMILY MEDICINE

## 2022-11-02 PROCEDURE — G0439 PPPS, SUBSEQ VISIT: HCPCS | Performed by: FAMILY MEDICINE

## 2022-11-02 PROCEDURE — G8417 CALC BMI ABV UP PARAM F/U: HCPCS | Performed by: FAMILY MEDICINE

## 2022-11-02 PROCEDURE — G8427 DOCREV CUR MEDS BY ELIG CLIN: HCPCS | Performed by: FAMILY MEDICINE

## 2022-11-02 PROCEDURE — G8484 FLU IMMUNIZE NO ADMIN: HCPCS | Performed by: FAMILY MEDICINE

## 2022-11-02 PROCEDURE — 1036F TOBACCO NON-USER: CPT | Performed by: FAMILY MEDICINE

## 2022-11-02 ASSESSMENT — LIFESTYLE VARIABLES
HOW MANY STANDARD DRINKS CONTAINING ALCOHOL DO YOU HAVE ON A TYPICAL DAY: 1 OR 2
HOW OFTEN DO YOU HAVE A DRINK CONTAINING ALCOHOL: MONTHLY OR LESS

## 2022-11-02 ASSESSMENT — PATIENT HEALTH QUESTIONNAIRE - PHQ9
1. LITTLE INTEREST OR PLEASURE IN DOING THINGS: 0
SUM OF ALL RESPONSES TO PHQ QUESTIONS 1-9: 0
SUM OF ALL RESPONSES TO PHQ QUESTIONS 1-9: 0
SUM OF ALL RESPONSES TO PHQ9 QUESTIONS 1 & 2: 0
SUM OF ALL RESPONSES TO PHQ QUESTIONS 1-9: 0
SUM OF ALL RESPONSES TO PHQ QUESTIONS 1-9: 0
2. FEELING DOWN, DEPRESSED OR HOPELESS: 0

## 2022-11-02 ASSESSMENT — ENCOUNTER SYMPTOMS
CONSTIPATION: 0
DIARRHEA: 0
SHORTNESS OF BREATH: 0
RHINORRHEA: 0

## 2022-11-02 NOTE — PATIENT INSTRUCTIONS
Personalized Preventive Plan for Dhara Ovens - 11/2/2022  Medicare offers a range of preventive health benefits. Some of the tests and screenings are paid in full while other may be subject to a deductible, co-insurance, and/or copay. Some of these benefits include a comprehensive review of your medical history including lifestyle, illnesses that may run in your family, and various assessments and screenings as appropriate. After reviewing your medical record and screening and assessments performed today your provider may have ordered immunizations, labs, imaging, and/or referrals for you. A list of these orders (if applicable) as well as your Preventive Care list are included within your After Visit Summary for your review. Other Preventive Recommendations:    A preventive eye exam performed by an eye specialist is recommended every 1-2 years to screen for glaucoma; cataracts, macular degeneration, and other eye disorders. A preventive dental visit is recommended every 6 months. Try to get at least 150 minutes of exercise per week or 10,000 steps per day on a pedometer . Order or download the FREE \"Exercise & Physical Activity: Your Everyday Guide\" from [employer]. Call 3-299.651.4411 or search [employer] online. You need 0777-4051 mg of calcium and 3252-9314 IU of vitamin D per day. It is possible to meet your calcium requirement with diet alone, but a vitamin D supplement is usually necessary to meet this goal.  When exposed to the sun, use a sunscreen that protects against both UVA and UVB radiation with an SPF of 30 or greater. Reapply every 2 to 3 hours or after sweating, drying off with a towel, or swimming. Always wear a seat belt when traveling in a car. Always wear a helmet when riding a bicycle or motorcycle.

## 2022-12-01 DIAGNOSIS — E78.00 PURE HYPERCHOLESTEROLEMIA: ICD-10-CM

## 2022-12-01 RX ORDER — ROSUVASTATIN CALCIUM 10 MG/1
TABLET, COATED ORAL
Qty: 30 TABLET | Refills: 3 | Status: SHIPPED | OUTPATIENT
Start: 2022-12-01

## 2023-02-03 ENCOUNTER — OFFICE VISIT (OUTPATIENT)
Dept: FAMILY MEDICINE CLINIC | Age: 78
End: 2023-02-03
Payer: MEDICARE

## 2023-02-03 VITALS
OXYGEN SATURATION: 98 % | TEMPERATURE: 97.1 F | DIASTOLIC BLOOD PRESSURE: 74 MMHG | SYSTOLIC BLOOD PRESSURE: 160 MMHG | WEIGHT: 240 LBS | HEART RATE: 72 BPM | BODY MASS INDEX: 35.44 KG/M2

## 2023-02-03 DIAGNOSIS — I10 PRIMARY HYPERTENSION: Primary | ICD-10-CM

## 2023-02-03 PROCEDURE — 1123F ACP DISCUSS/DSCN MKR DOCD: CPT | Performed by: FAMILY MEDICINE

## 2023-02-03 PROCEDURE — G8417 CALC BMI ABV UP PARAM F/U: HCPCS | Performed by: FAMILY MEDICINE

## 2023-02-03 PROCEDURE — 1036F TOBACCO NON-USER: CPT | Performed by: FAMILY MEDICINE

## 2023-02-03 PROCEDURE — G8427 DOCREV CUR MEDS BY ELIG CLIN: HCPCS | Performed by: FAMILY MEDICINE

## 2023-02-03 PROCEDURE — 99213 OFFICE O/P EST LOW 20 MIN: CPT | Performed by: FAMILY MEDICINE

## 2023-02-03 PROCEDURE — 3078F DIAST BP <80 MM HG: CPT | Performed by: FAMILY MEDICINE

## 2023-02-03 PROCEDURE — 3077F SYST BP >= 140 MM HG: CPT | Performed by: FAMILY MEDICINE

## 2023-02-03 PROCEDURE — G8484 FLU IMMUNIZE NO ADMIN: HCPCS | Performed by: FAMILY MEDICINE

## 2023-02-03 RX ORDER — HYDROCHLOROTHIAZIDE 12.5 MG/1
12.5 CAPSULE, GELATIN COATED ORAL EVERY MORNING
Qty: 90 CAPSULE | Refills: 1 | Status: SHIPPED | OUTPATIENT
Start: 2023-02-03

## 2023-02-03 SDOH — ECONOMIC STABILITY: FOOD INSECURITY: WITHIN THE PAST 12 MONTHS, THE FOOD YOU BOUGHT JUST DIDN'T LAST AND YOU DIDN'T HAVE MONEY TO GET MORE.: NEVER TRUE

## 2023-02-03 SDOH — ECONOMIC STABILITY: INCOME INSECURITY: HOW HARD IS IT FOR YOU TO PAY FOR THE VERY BASICS LIKE FOOD, HOUSING, MEDICAL CARE, AND HEATING?: SOMEWHAT HARD

## 2023-02-03 SDOH — ECONOMIC STABILITY: FOOD INSECURITY: WITHIN THE PAST 12 MONTHS, YOU WORRIED THAT YOUR FOOD WOULD RUN OUT BEFORE YOU GOT MONEY TO BUY MORE.: NEVER TRUE

## 2023-02-03 SDOH — ECONOMIC STABILITY: HOUSING INSECURITY
IN THE LAST 12 MONTHS, WAS THERE A TIME WHEN YOU DID NOT HAVE A STEADY PLACE TO SLEEP OR SLEPT IN A SHELTER (INCLUDING NOW)?: NO

## 2023-02-03 ASSESSMENT — ENCOUNTER SYMPTOMS: SHORTNESS OF BREATH: 0

## 2023-02-03 NOTE — PROGRESS NOTES
SUBJECTIVE:    Ines Oviedo is a 65 y.o. male who presents for a follow up visit. Chief Complaint   Patient presents with    Follow-up     Patient concerned about B/P running high, last week it was around 150/80's. Hypertension  This is a chronic problem. The current episode started more than 1 year ago. The problem is uncontrolled. Associated symptoms include anxiety. Pertinent negatives include no chest pain or shortness of breath. Risk factors for coronary artery disease include dyslipidemia, male gender, obesity and sedentary lifestyle. Past treatments include angiotensin blockers. The current treatment provides moderate improvement. Compliance problems include exercise and diet. He states that he has been checking his blood pressure recently and it remains elevated usually with the systolics in the 606G but diastolics are usually in the 80s. Patient's medications, allergies, past medical,surgical, social and family histories were reviewed and updated as appropriate.      Past Medical History:   Diagnosis Date    Cancer [facility])     rectal and bladder    Hyperlipidemia     borderline-no meds    Hypertension     Tobacco use disorder     Ventral hernia 04/26/2017    Villous adenoma      Past Surgical History:   Procedure Laterality Date    ABDOMEN SURGERY  1/2016    ap resection for rectal cancer    CERVICAL DISC ARTHROPLASTY  2015    C5    COLONOSCOPY  4/2/14    with ultrasound, biopsy taken    COLONOSCOPY N/A 10/25/2019    COLONOSCOPY POLYPECTOMY REMOVAL HOT BIOPSY/STOMA performed by Rina Monroy MD at [address] [facility] bladder tumor    [facility] ARTHROSCOPY Left     OTHER SURGICAL HISTORY      EUS with fine needle biopsy of lymph node 6/8/11    OTHER SURGICAL HISTORY Left 6/29/15    left port a cath placement    RECTAL SURGERY      for cancer    SHOULDER ARTHROSCOPY       Family History   Problem Relation Age of Onset    Lung Cancer Mother     Early Death Father     Diabetes Sister     Diabetes Brother      Social History     Tobacco Use    Smoking status: Former     Packs/day: 1.00     Years: 40.00     Pack years: 40.00     Types: Cigarettes     Quit date: 2016     Years since quittin.0    Smokeless tobacco: Never    Tobacco comments:     quit 2016   Substance Use Topics    Alcohol use: Yes     Alcohol/week: 0.0 standard drinks     Types: 2 Cans of beer per week     Comment: rare      Allergies   Allergen Reactions    Lisinopril Other (See Comments)     Itchy throat with swelling-difficulty swallowing     Current Outpatient Medications on File Prior to Visit   Medication Sig Dispense Refill    rosuvastatin (CRESTOR) 10 MG tablet TAKE ONE TABLET BY MOUTH ONCE NIGHTLY 30 tablet 3    irbesartan (AVAPRO) 150 MG tablet Take 1 tablet by mouth daily 30 tablet 3    empagliflozin (JARDIANCE) 10 MG tablet Take 1 tablet by mouth daily 90 tablet 3     No current facility-administered medications on file prior to visit. Review of Systems   Respiratory:  Negative for shortness of breath. Cardiovascular:  Negative for chest pain. OBJECTIVE:    BP (!) 160/74   Pulse 72   Temp 97.1 °F (36.2 °C)   Wt 240 lb (108.9 kg)   SpO2 98%   BMI 35.44 kg/m²    Vitals:    23 1039 23 1105   BP: (!) 160/80 (!) 160/74   Pulse: 72    Temp: 97.1 °F (36.2 °C)    SpO2: 98%    Weight: 240 lb (108.9 kg)        Physical Exam  Constitutional:       Appearance: He is well-developed. HENT:      Head: Normocephalic and atraumatic. Right Ear: External ear normal.      Left Ear: External ear normal.      Nose: Nose normal.   Eyes:      General:         Right eye: No discharge. Conjunctiva/sclera: Conjunctivae normal.   Neck:      Thyroid: No thyromegaly. Vascular: No JVD. Trachea: No tracheal deviation. Cardiovascular:      Rate and Rhythm: Normal rate and regular rhythm. Heart sounds: Normal heart sounds.    Pulmonary:      Effort: Pulmonary effort is normal. No respiratory distress. Breath sounds: Normal breath sounds. No rales. Musculoskeletal:      Cervical back: Normal range of motion and neck supple. Lymphadenopathy:      Cervical: No cervical adenopathy. Skin:     General: Skin is warm and dry. Neurological:      Mental Status: He is alert and oriented to person, place, and time. Psychiatric:         Mood and Affect: Mood normal.         Behavior: Behavior normal.       ASSESSMENT/PLAN:    Dayan Melendez was seen today for follow-up. Diagnoses and all orders for this visit:    Primary hypertension  -     hydroCHLOROthiazide (MICROZIDE) 12.5 MG capsule; Take 1 capsule by mouth every morning  I have asked him to monitor his blood pressure 3-4 times a week and keep a log. He should also bring his monitor with him to compare to our cuff. Return in about 4 weeks (around 3/3/2023). Please note portions of this note were completed with a voicerecognition program.  Efforts were made to edit the dictations but occasionally words are mis-transcribed.

## 2023-02-09 DIAGNOSIS — I10 PRIMARY HYPERTENSION: ICD-10-CM

## 2023-02-10 RX ORDER — IRBESARTAN 150 MG/1
TABLET ORAL
Qty: 90 TABLET | Refills: 1 | Status: SHIPPED | OUTPATIENT
Start: 2023-02-10

## 2023-02-11 ENCOUNTER — TELEPHONE (OUTPATIENT)
Dept: ENT CLINIC | Age: 78
End: 2023-02-11

## 2023-02-11 NOTE — TELEPHONE ENCOUNTER
Please call patient to schedule an appointment as soon as possible for follow-up of his ears, hearing loss, and the left parotid mass seen on MRI scan. Ron Briseno

## 2023-02-15 ENCOUNTER — OFFICE VISIT (OUTPATIENT)
Dept: ENT CLINIC | Age: 78
End: 2023-02-15
Payer: MEDICARE

## 2023-02-15 VITALS
HEIGHT: 69 IN | DIASTOLIC BLOOD PRESSURE: 77 MMHG | TEMPERATURE: 97.7 F | BODY MASS INDEX: 35.55 KG/M2 | HEART RATE: 66 BPM | SYSTOLIC BLOOD PRESSURE: 169 MMHG | WEIGHT: 240 LBS

## 2023-02-15 DIAGNOSIS — K11.8 MASS OF LEFT PAROTID GLAND: Chronic | ICD-10-CM

## 2023-02-15 DIAGNOSIS — H90.3 ASYMMETRICAL SENSORINEURAL HEARING LOSS: Primary | Chronic | ICD-10-CM

## 2023-02-15 DIAGNOSIS — H93.13 SUBJECTIVE TINNITUS OF BOTH EARS: Chronic | ICD-10-CM

## 2023-02-15 PROCEDURE — 99214 OFFICE O/P EST MOD 30 MIN: CPT | Performed by: OTOLARYNGOLOGY

## 2023-02-15 PROCEDURE — 3077F SYST BP >= 140 MM HG: CPT | Performed by: OTOLARYNGOLOGY

## 2023-02-15 PROCEDURE — 1123F ACP DISCUSS/DSCN MKR DOCD: CPT | Performed by: OTOLARYNGOLOGY

## 2023-02-15 PROCEDURE — G8417 CALC BMI ABV UP PARAM F/U: HCPCS | Performed by: OTOLARYNGOLOGY

## 2023-02-15 PROCEDURE — 1036F TOBACCO NON-USER: CPT | Performed by: OTOLARYNGOLOGY

## 2023-02-15 PROCEDURE — 3078F DIAST BP <80 MM HG: CPT | Performed by: OTOLARYNGOLOGY

## 2023-02-15 PROCEDURE — G8484 FLU IMMUNIZE NO ADMIN: HCPCS | Performed by: OTOLARYNGOLOGY

## 2023-02-15 PROCEDURE — G8427 DOCREV CUR MEDS BY ELIG CLIN: HCPCS | Performed by: OTOLARYNGOLOGY

## 2023-02-15 NOTE — PROGRESS NOTES
[facility]          PCP:  Estephania Storm MD      CHIEF COMPLAINT  Chief Complaint   Patient presents with    Hearing Loss     Asymmetric left greater than right. HISTORY OF PRESENT ILLNESS    Manuel Dang is a 65 y.o. male here for recheck and follow up of asymmetric sensorineural hearing loss left greater than right with markedly decreased word recognition score and tinnitus. Patient stated that his hearing and tinnitus is about the same. In regard to the left parotid mass, the patient stated that \"I had that for many years, as long as I can remember. It has not increased in size. I had a PET scan twice for colon cancer, and the mass showed up and they told me it was nothing to worry about. \"      PAST MEDICAL HISTORY    Past Medical History:   Diagnosis Date    Cancer ([facility] [address]     rectal and bladder    Hyperlipidemia     borderline-no meds    Hypertension     Tobacco use disorder     Ventral hernia 04/26/2017    Villous adenoma          Past Surgical History:   Procedure Laterality Date    ABDOMEN SURGERY  1/2016    ap resection for rectal cancer    CERVICAL DISC ARTHROPLASTY  2015    C5    COLONOSCOPY  4/2/14    with ultrasound, biopsy taken    COLONOSCOPY N/A 10/25/2019    COLONOSCOPY POLYPECTOMY REMOVAL HOT BIOPSY/STOMA performed by Thanh Arambula MD at [address] [place] [address] bladder tumor    [facility] ARTHROSCOPY Left     OTHER SURGICAL HISTORY      EUS with fine needle biopsy of lymph node 6/8/11    OTHER SURGICAL HISTORY Left 6/29/15    left port a cath placement    RECTAL SURGERY      for cancer    SHOULDER ARTHROSCOPY           EXAMINATION    Vitals:    02/15/23 0758   BP: (!) 169/77   Pulse: 66   Temp: 97.7 °F (36.5 °C)   TempSrc: Temporal   Weight: 240 lb (108.9 kg)   Height: 5' 9\" (1.753 m)     General:  WDWN, NAD, alert and oriented  Face:   There was no swelling or lesions detected. Voice: Normal with no hoarseness or hot potato voice. (+) Ears:  TMs nonerythematous, dull, and thick with perforation or monomeric membranes in the anterior area. The external ears, mastoids, and EACs appeared to be normal.   (+) Salivary Glands: Soft nontender vague mass in the inferior left parotid gland in the tail area. Normal bilateral parotid and bilateral submandibular salivary glands. Remainder the left parotid gland and the entire right parotid gland were normal to palpation. Submandibular glands were normal to palpation. Neck:  No masses or tenderness. Trachea midline. Laryngeal cartilages and hyoid bone normal.  Normal laryngeal crepitus. Lymph nodes:  No cervical lymphadenopathy. REVIEW OF IMAGES   I reviewed the images of the MRI scan of the IACs/brain/head, showing a left parotid mass, no evidence of vestibular schwannoma, and aging effects of the brain. I agree with radiologist interpretation. Narrative   EXAMINATION:   MRI OF THE BRAIN AND INTERNAL AUDITORY CANALS WITH AND WITHOUT CONTRAST   8/24/2022 9:13 am       TECHNIQUE:   Multiplanar multisequence MRI of the brain focused on the internal auditory   canals was performed with and without the administration of intravenous   contrast.       COMPARISON:   None. HISTORY:   ORDERING SYSTEM PROVIDED HISTORY: Asymmetrical sensorineural hearing loss   TECHNOLOGIST PROVIDED HISTORY:   STAT Creatinine as needed:->No   Reason for Exam: Asymmetrical sensorineural hearing loss (Left greater than   right with marked decrease in speech reception threshold and word   discrimination score, suspicious for vestibular schwannoma.)       Initial evaluation. FINDINGS:   INTERNAL AUDITORY CANALS: No mass or abnormal enhancement within the   cerebellopontine angle cisterns or internal auditory canals. No abnormal   enhancement seen along of the facial or vestibulocochlear nerves.  The inner   ear structures appear unremarkable. The middle ear cavities are clear. INTRACRANIAL STRUCTURES/VENTRICLES:  There is no acute infarct. No mass   effect or midline shift. No evidence of an acute intracranial hemorrhage. No   abnormal extra-axial fluid collection. Areas of T2 FLAIR hyperintensity are   seen in the periventricular and subcortical white matter, which are   nonspecific, but may represent chronic microvascular ischemic change. There   is minimal global parenchymal volume loss. Otherwise, the ventricles and   sulci are normal in size and configuration. The sellar/suprasellar regions   appear unremarkable. The normal signal voids within the major intracranial   vessels appear maintained. No abnormal focus of enhancement is seen within   the brain. ORBITS: The visualized portion of the orbits demonstrate no acute abnormality. SINUSES: The visualized paranasal sinuses and mastoid air cells are well   aerated. BONES/SOFT TISSUES: The bone marrow signal demonstrates no acute abnormality. There is a partially visualized 2.8 cm nodule in the region of the left   parotid tail (sagittal T1 series 3, image 1). Impression   1. No acute intracranial abnormality. No acute infarct. 2. No abnormality seen within the cerebellopontine angle cisterns or internal   auditory canals. 3. Minimal global parenchymal volume loss with mild chronic microvascular   ischemic changes. 4. There is a partially visualized nodule in the region of the left parotid   tail measuring 2.8 cm. Unclear whether this represents an enlarged   intraparotid/periparotid lymph node versus a primary parotid lesion. Elizabeth Duffy / Sadie Quevedo / Liz Bacon was seen today for hearing loss.     Diagnoses and all orders for this visit:    Asymmetrical sensorineural hearing loss    Subjective tinnitus of both ears    Mass of left parotid gland  -     CT SOFT TISSUE NECK W CONTRAST         RECOMMENDATIONS/PLAN CT scan soft tissue neck with contrast.  Excision of left parotid mass, superficial or total parotidectomy, with facial nerve dissection and preservation. Discussed the alternative of a fine-needle aspiration of the left parotid mass versus observation for further growth. We will discuss further at follow-up visit. Return in about 1 month (around 3/15/2023) for follow-up of left parotid mass.

## 2023-04-11 DIAGNOSIS — R73.9 HYPERGLYCEMIA: ICD-10-CM

## 2023-04-11 DIAGNOSIS — E78.00 PURE HYPERCHOLESTEROLEMIA: ICD-10-CM

## 2023-04-11 LAB
ALBUMIN SERPL-MCNC: 4.2 G/DL (ref 3.4–5)
ALBUMIN/GLOB SERPL: 1.8 {RATIO} (ref 1.1–2.2)
ALP SERPL-CCNC: 61 U/L (ref 40–129)
ALT SERPL-CCNC: 18 U/L (ref 10–40)
ANION GAP SERPL CALCULATED.3IONS-SCNC: 12 MMOL/L (ref 3–16)
AST SERPL-CCNC: 23 U/L (ref 15–37)
BASOPHILS # BLD: 0.1 K/UL (ref 0–0.2)
BASOPHILS NFR BLD: 1 %
BILIRUB SERPL-MCNC: 0.5 MG/DL (ref 0–1)
BUN SERPL-MCNC: 16 MG/DL (ref 7–20)
CALCIUM SERPL-MCNC: 8.9 MG/DL (ref 8.3–10.6)
CHLORIDE SERPL-SCNC: 105 MMOL/L (ref 99–110)
CHOLEST SERPL-MCNC: 147 MG/DL (ref 0–199)
CO2 SERPL-SCNC: 25 MMOL/L (ref 21–32)
CREAT SERPL-MCNC: 0.9 MG/DL (ref 0.8–1.3)
DEPRECATED RDW RBC AUTO: 14.6 % (ref 12.4–15.4)
EOSINOPHIL # BLD: 0.3 K/UL (ref 0–0.6)
EOSINOPHIL NFR BLD: 5.9 %
GFR SERPLBLD CREATININE-BSD FMLA CKD-EPI: >60 ML/MIN/{1.73_M2}
GLUCOSE P FAST SERPL-MCNC: 114 MG/DL (ref 70–99)
HCT VFR BLD AUTO: 44 % (ref 40.5–52.5)
HDLC SERPL-MCNC: 39 MG/DL (ref 40–60)
HGB BLD-MCNC: 14.8 G/DL (ref 13.5–17.5)
LDL CHOLESTEROL CALCULATED: 91 MG/DL
LYMPHOCYTES # BLD: 2.3 K/UL (ref 1–5.1)
LYMPHOCYTES NFR BLD: 44.2 %
MCH RBC QN AUTO: 29.2 PG (ref 26–34)
MCHC RBC AUTO-ENTMCNC: 33.6 G/DL (ref 31–36)
MCV RBC AUTO: 86.8 FL (ref 80–100)
MONOCYTES # BLD: 0.4 K/UL (ref 0–1.3)
MONOCYTES NFR BLD: 8.4 %
NEUTROPHILS # BLD: 2.1 K/UL (ref 1.7–7.7)
NEUTROPHILS NFR BLD: 40.5 %
PLATELET # BLD AUTO: 128 K/UL (ref 135–450)
PMV BLD AUTO: 10.1 FL (ref 5–10.5)
POTASSIUM SERPL-SCNC: 4.7 MMOL/L (ref 3.5–5.1)
PROT SERPL-MCNC: 6.6 G/DL (ref 6.4–8.2)
RBC # BLD AUTO: 5.07 M/UL (ref 4.2–5.9)
SODIUM SERPL-SCNC: 142 MMOL/L (ref 136–145)
TRIGL SERPL-MCNC: 84 MG/DL (ref 0–150)
TSH SERPL DL<=0.005 MIU/L-ACNC: 3.25 UIU/ML (ref 0.27–4.2)
VLDLC SERPL CALC-MCNC: 17 MG/DL
WBC # BLD AUTO: 5.2 K/UL (ref 4–11)

## 2023-04-12 LAB
EST. AVERAGE GLUCOSE BLD GHB EST-MCNC: 125.5 MG/DL
HBA1C MFR BLD: 6 %

## 2023-05-05 NOTE — PATIENT INSTRUCTIONS
Schedule an Echo- we will call you with results  Labs today CBC, BNP, CMP and repeat lipids in 3 months- lipids are fasting do not eat for 8 hours prior, you may have water 5 (moderate pain)

## 2023-05-12 ENCOUNTER — OFFICE VISIT (OUTPATIENT)
Dept: FAMILY MEDICINE CLINIC | Age: 78
End: 2023-05-12
Payer: MEDICARE

## 2023-05-12 ENCOUNTER — TELEPHONE (OUTPATIENT)
Dept: ENT CLINIC | Age: 78
End: 2023-05-12

## 2023-05-12 VITALS — TEMPERATURE: 98.8 F

## 2023-05-12 DIAGNOSIS — H61.23 BILATERAL IMPACTED CERUMEN: Primary | ICD-10-CM

## 2023-05-12 PROCEDURE — 99211 OFF/OP EST MAY X REQ PHY/QHP: CPT | Performed by: FAMILY MEDICINE

## 2023-05-12 PROCEDURE — G8428 CUR MEDS NOT DOCUMENT: HCPCS | Performed by: FAMILY MEDICINE

## 2023-05-12 PROCEDURE — G8417 CALC BMI ABV UP PARAM F/U: HCPCS | Performed by: FAMILY MEDICINE

## 2023-08-12 DIAGNOSIS — I10 PRIMARY HYPERTENSION: ICD-10-CM

## 2023-08-14 RX ORDER — IRBESARTAN 150 MG/1
TABLET ORAL
Qty: 90 TABLET | Refills: 0 | Status: SHIPPED | OUTPATIENT
Start: 2023-08-14

## 2023-08-14 RX ORDER — HYDROCHLOROTHIAZIDE 12.5 MG/1
CAPSULE, GELATIN COATED ORAL
Qty: 90 CAPSULE | Refills: 0 | Status: SHIPPED | OUTPATIENT
Start: 2023-08-14

## 2023-10-18 DIAGNOSIS — E78.00 PURE HYPERCHOLESTEROLEMIA: ICD-10-CM

## 2023-10-18 RX ORDER — ROSUVASTATIN CALCIUM 10 MG/1
10 TABLET, COATED ORAL
Qty: 30 TABLET | Refills: 5 | Status: SHIPPED | OUTPATIENT
Start: 2023-10-18

## 2023-11-09 DIAGNOSIS — I10 PRIMARY HYPERTENSION: ICD-10-CM

## 2023-11-09 RX ORDER — HYDROCHLOROTHIAZIDE 12.5 MG/1
CAPSULE, GELATIN COATED ORAL
Qty: 90 CAPSULE | Refills: 0 | Status: SHIPPED | OUTPATIENT
Start: 2023-11-09

## 2023-11-09 RX ORDER — IRBESARTAN 150 MG/1
150 TABLET ORAL DAILY
Qty: 90 TABLET | Refills: 0 | Status: SHIPPED | OUTPATIENT
Start: 2023-11-09

## 2023-11-13 ENCOUNTER — HOSPITAL ENCOUNTER (OUTPATIENT)
Age: 78
Discharge: HOME | End: 2023-11-13
Payer: MEDICARE

## 2023-11-13 DIAGNOSIS — Z12.5 SCREENING FOR MALIGNANT NEOPLASM OF PROSTATE: ICD-10-CM

## 2023-11-13 DIAGNOSIS — E78.00 PURE HYPERCHOLESTEROLEMIA: ICD-10-CM

## 2023-11-13 LAB
ALBUMIN SERPL-MCNC: 4.2 G/DL (ref 3.4–5)
ALBUMIN/GLOB SERPL: 1.6 {RATIO} (ref 1.1–2.2)
ALP SERPL-CCNC: 56 U/L (ref 40–129)
ALT SERPL-CCNC: 13 U/L (ref 10–40)
ANION GAP SERPL CALCULATED.3IONS-SCNC: 10 MMOL/L (ref 3–16)
AST SERPL-CCNC: 21 U/L (ref 15–37)
BASOPHILS # BLD: 0.1 K/UL (ref 0–0.2)
BASOPHILS NFR BLD: 1.1 %
BILIRUB SERPL-MCNC: 0.5 MG/DL (ref 0–1)
BUN SERPL-MCNC: 17 MG/DL (ref 7–20)
CALCIUM SERPL-MCNC: 9.5 MG/DL (ref 8.3–10.6)
CHLORIDE SERPL-SCNC: 105 MMOL/L (ref 99–110)
CHOLEST SERPL-MCNC: 136 MG/DL (ref 0–199)
CO2 SERPL-SCNC: 25 MMOL/L (ref 21–32)
CREAT SERPL-MCNC: 0.9 MG/DL (ref 0.8–1.3)
DEPRECATED RDW RBC AUTO: 14.4 % (ref 12.4–15.4)
EOSINOPHIL # BLD: 0.2 K/UL (ref 0–0.6)
EOSINOPHIL NFR BLD: 3.6 %
GFR SERPLBLD CREATININE-BSD FMLA CKD-EPI: >60 ML/MIN/{1.73_M2}
GLUCOSE P FAST SERPL-MCNC: 112 MG/DL (ref 70–99)
HCT VFR BLD AUTO: 44.8 % (ref 40.5–52.5)
HDLC SERPL-MCNC: 38 MG/DL (ref 40–60)
HGB BLD-MCNC: 14.9 G/DL (ref 13.5–17.5)
LDL CHOLESTEROL CALCULATED: 81 MG/DL
LYMPHOCYTES # BLD: 1.9 K/UL (ref 1–5.1)
LYMPHOCYTES NFR BLD: 29.7 %
MCH RBC QN AUTO: 29.5 PG (ref 26–34)
MCHC RBC AUTO-ENTMCNC: 33.2 G/DL (ref 31–36)
MCV RBC AUTO: 88.7 FL (ref 80–100)
MONOCYTES # BLD: 0.5 K/UL (ref 0–1.3)
MONOCYTES NFR BLD: 8.1 %
NEUTROPHILS # BLD: 3.6 K/UL (ref 1.7–7.7)
NEUTROPHILS NFR BLD: 57.5 %
PLATELET # BLD AUTO: 133 K/UL (ref 135–450)
PMV BLD AUTO: 10.3 FL (ref 5–10.5)
POTASSIUM SERPL-SCNC: 4.6 MMOL/L (ref 3.5–5.1)
PROT SERPL-MCNC: 6.9 G/DL (ref 6.4–8.2)
PSA SERPL DL<=0.01 NG/ML-MCNC: 0.37 NG/ML (ref 0–4)
RBC # BLD AUTO: 5.05 M/UL (ref 4.2–5.9)
SODIUM SERPL-SCNC: 140 MMOL/L (ref 136–145)
TRIGL SERPL-MCNC: 87 MG/DL (ref 0–150)
VLDLC SERPL CALC-MCNC: 17 MG/DL
WBC # BLD AUTO: 6.3 K/UL (ref 4–11)

## 2023-11-13 PROCEDURE — 85025 COMPLETE CBC W/AUTO DIFF WBC: CPT

## 2023-11-13 PROCEDURE — 80053 COMPREHEN METABOLIC PANEL: CPT

## 2023-11-13 PROCEDURE — G0103 PSA SCREENING: HCPCS

## 2023-11-13 PROCEDURE — 80061 LIPID PANEL: CPT

## 2023-11-13 PROCEDURE — 84153 ASSAY OF PSA TOTAL: CPT

## 2023-11-13 PROCEDURE — 36415 COLL VENOUS BLD VENIPUNCTURE: CPT

## 2023-11-13 SDOH — HEALTH STABILITY: PHYSICAL HEALTH: ON AVERAGE, HOW MANY MINUTES DO YOU ENGAGE IN EXERCISE AT THIS LEVEL?: 30 MIN

## 2023-11-13 SDOH — HEALTH STABILITY: PHYSICAL HEALTH: ON AVERAGE, HOW MANY DAYS PER WEEK DO YOU ENGAGE IN MODERATE TO STRENUOUS EXERCISE (LIKE A BRISK WALK)?: 5 DAYS

## 2023-11-13 ASSESSMENT — LIFESTYLE VARIABLES
HOW MANY STANDARD DRINKS CONTAINING ALCOHOL DO YOU HAVE ON A TYPICAL DAY: 1 OR 2
HOW OFTEN DO YOU HAVE SIX OR MORE DRINKS ON ONE OCCASION: 1
HOW MANY STANDARD DRINKS CONTAINING ALCOHOL DO YOU HAVE ON A TYPICAL DAY: 1
HOW OFTEN DO YOU HAVE A DRINK CONTAINING ALCOHOL: 2-4 TIMES A MONTH
HOW OFTEN DO YOU HAVE A DRINK CONTAINING ALCOHOL: 3

## 2023-11-13 ASSESSMENT — PATIENT HEALTH QUESTIONNAIRE - PHQ9
1. LITTLE INTEREST OR PLEASURE IN DOING THINGS: 0
SUM OF ALL RESPONSES TO PHQ9 QUESTIONS 1 & 2: 0
SUM OF ALL RESPONSES TO PHQ QUESTIONS 1-9: 0
2. FEELING DOWN, DEPRESSED OR HOPELESS: 0

## 2023-11-14 ENCOUNTER — OFFICE VISIT (OUTPATIENT)
Dept: FAMILY MEDICINE CLINIC | Age: 78
End: 2023-11-14

## 2023-11-14 VITALS
OXYGEN SATURATION: 97 % | DIASTOLIC BLOOD PRESSURE: 82 MMHG | SYSTOLIC BLOOD PRESSURE: 132 MMHG | WEIGHT: 234.2 LBS | RESPIRATION RATE: 16 BRPM | HEART RATE: 80 BPM | BODY MASS INDEX: 34.69 KG/M2 | HEIGHT: 69 IN | TEMPERATURE: 98.2 F

## 2023-11-14 DIAGNOSIS — Z85.51 HISTORY OF BLADDER CANCER: Primary | ICD-10-CM

## 2023-11-14 DIAGNOSIS — I10 PRIMARY HYPERTENSION: ICD-10-CM

## 2023-11-14 DIAGNOSIS — Z87.891 PERSONAL HISTORY OF TOBACCO USE: ICD-10-CM

## 2023-11-14 DIAGNOSIS — E78.00 PURE HYPERCHOLESTEROLEMIA: ICD-10-CM

## 2023-11-14 DIAGNOSIS — Z00.00 MEDICARE ANNUAL WELLNESS VISIT, SUBSEQUENT: ICD-10-CM

## 2023-11-14 DIAGNOSIS — F17.200 SMOKER, CURRENT STATUS UNKNOWN: ICD-10-CM

## 2023-11-14 DIAGNOSIS — D48.9 VILLOUS ADENOMA: ICD-10-CM

## 2023-11-14 DIAGNOSIS — Z23 NEED FOR VACCINATION: ICD-10-CM

## 2023-11-14 DIAGNOSIS — Z87.891 EX-SMOKER: ICD-10-CM

## 2023-11-14 DIAGNOSIS — R73.9 HYPERGLYCEMIA: ICD-10-CM

## 2023-11-14 ASSESSMENT — ENCOUNTER SYMPTOMS
BACK PAIN: 0
RHINORRHEA: 0
EYE ITCHING: 1
CONSTIPATION: 0
DIARRHEA: 0
SHORTNESS OF BREATH: 1
APNEA: 0

## 2023-11-20 NOTE — TELEPHONE ENCOUNTER
Last OV: 22  Next OV: none scheduled ( attempted to contact pt to schedule but no answer or vm to leave a message)  Last labs: CMP and CBC 23  Last EK22  Last filled:   Disp Refills Start End    empagliflozin (JARDIANCE) 10 MG tablet 90 tablet 3 2022     Sig - Route:  Take 1 tablet by mouth daily - Oral    Sent to pharmacy as: Empagliflozin 10 MG Oral Tablet (Jardiance)    Cosign for Ordering: Accepted by Duane Hales, MD on 2022  5:36 PM    E-Prescribing Status: Receipt confirmed by pharmacy (2022  4:47 PM EDT)      Order queued for 30 day supply until pt returns call to schedule appointment

## 2023-11-30 ENCOUNTER — TELEPHONE (OUTPATIENT)
Dept: CARDIOLOGY CLINIC | Age: 78
End: 2023-11-30

## 2023-12-11 ENCOUNTER — TELEPHONE (OUTPATIENT)
Dept: CARDIOLOGY CLINIC | Age: 78
End: 2023-12-11

## 2023-12-11 NOTE — TELEPHONE ENCOUNTER
Last OV: 22 WAK  Next OV: 24 WAK  Last Labs: BMP 22   Last EK22  Last Fill:   empagliflozin (JARDIANCE) 10 MG tablet 30 tablet 0 2023     Sig - Route:  Take 1 tablet by mouth daily - Oral    Sent to pharmacy as: Empagliflozin 10 MG Oral Tablet (Jardiance)    E-Prescribing Status: Receipt confirmed by pharmacy (2023 12:22 PM EST)

## 2023-12-11 NOTE — TELEPHONE ENCOUNTER
Pt called stating they will need refill on medications before appt 1/26/2024 for the Jardiance    Medication Refill    Medication needing refilled:  empagliflozin (JARDIANCE) 10 MG tablet   Dosage of the medication:  10 MG  How are you taking this medication (QD, BID, TID, QID, PRN):  Take 1 tablet by mouth daily  30 or 90 day supply called in:  30 day supply  Which Pharmacy are we sending the medication to?:   [facility] [id number] [place], [place] - [address] [facility] [phone]  [address] [place] [address]  [address]  Phone: [phone]  Fax: [phone]

## 2024-01-24 PROBLEM — E66.9 OBESITY (BMI 30.0-34.9): Status: ACTIVE | Noted: 2024-01-24

## 2024-01-24 PROBLEM — E66.811 OBESITY (BMI 30.0-34.9): Status: ACTIVE | Noted: 2024-01-24

## 2024-01-24 NOTE — PROGRESS NOTES
mouth daily 12/20/23   Jhonny Chowdhury MD   irbesartan (AVAPRO) 150 MG tablet TAKE 1 TABLET BY MOUTH DAILY 11/9/23   Subhash Burris Jr., MD   hydroCHLOROthiazide (MICROZIDE) 12.5 MG capsule TAKE ONE CAPSULE BY MOUTH EVERY MORNING 11/9/23   Subhash Burris Jr., MD   rosuvastatin (CRESTOR) 10 MG tablet TAKE ONE TABLET BY MOUTH ONCE NIGHTLY 10/18/23   Subhash Burris Jr., MD       PHYSICAL EXAM        There were no vitals filed for this visit.         Gen Alert, cooperative, no distress Heart  Regular rate and rhythm, no murmur   Head Normocephalic, atraumatic, no abnormalities Abd  Soft, NT, +BS, no mass, no OM   Eyes PERRLA, conj/corn clear Ext  Ext nl, AT, no C/C, no edema   Nose Nares normal, no drain age, Non-tender Pulse 2+ and symmetric   Throat Lips, mucosa, tongue normal Skin Color/text/turg nl, no rash/lesions   Neck S/S, TM, NT, no bruit Psych Nl mood and affect   Lung CTA-B, unlabored, no DTP     Ch wall NT, no deform       LABS and Imaging     Relevant and available CV data reviewed    EKG personally interpreted: 8/16/22 SR with PAC    4/10/19 Luis   Sinus rhythm   Probable left atrial enlargement   Probable anteroseptal infarct, old   Baseline wander in lead(s) V2       ModerateRisk  ModerateComplexity/Medical Decision Making    Outside/Care everywhere records Reviewed  Labs Reviewed  Prior Imaging, ekg, cath, echo reviewed when available  Medications reviewed  Old Notes reviewed  ASSESSMENT AND PLAN     SOB with exertion  No chest pain  DDx includes heart failure; at risk of CHF due to chemo in the past; no anginal symptoms  Plan: BNP, CBC, CMP, Echo    2. Pure hypercholesterolemia   7/2/22 Lipid Panel   HDL 31   Crestor 10 mg daily  Plan: Continue current meds; Repeat lipid panel in 3 months; has upcoming CT lung cancer screening, if significant coronary calcifications are seen his LDL goal would be < 70    3.HTN  BP today 160/55  Losartan 100 mg daily  Plan: 
tablet Take 1 tablet by mouth daily 12/20/23   Jhonny Chowdhury MD   irbesartan (AVAPRO) 150 MG tablet TAKE 1 TABLET BY MOUTH DAILY 11/9/23   Subhash Burris Jr., MD   hydroCHLOROthiazide (MICROZIDE) 12.5 MG capsule TAKE ONE CAPSULE BY MOUTH EVERY MORNING 11/9/23   Subhash Burris Jr., MD   rosuvastatin (CRESTOR) 10 MG tablet TAKE ONE TABLET BY MOUTH ONCE NIGHTLY 10/18/23   Subhash Burris Jr., MD       PHYSICAL EXAM        There were no vitals filed for this visit.         Gen Alert, cooperative, no distress Heart  Regular rate and rhythm, no murmur   Head Normocephalic, atraumatic, no abnormalities Abd  Soft, NT, +BS, no mass, no OM   Eyes PERRLA, conj/corn clear Ext  Ext nl, AT, no C/C, no edema   Nose Nares normal, no drain age, Non-tender Pulse 2+ and symmetric   Throat Lips, mucosa, tongue normal Skin Color/text/turg nl, no rash/lesions   Neck S/S, TM, NT, no bruit Psych Nl mood and affect   Lung CTA-B, unlabored, no DTP     Ch wall NT, no deform       LABS and Imaging     Relevant and available CV data reviewed    EKG personally interpreted: 8/16/22 SR with PAC    4/10/19 Luis   Sinus rhythm   Probable left atrial enlargement   Probable anteroseptal infarct, old   Baseline wander in lead(s) V2       ModerateRisk  ModerateComplexity/Medical Decision Making    Outside/Care everywhere records Reviewed  Labs Reviewed  Prior Imaging, ekg, cath, echo reviewed when available  Medications reviewed  Old Notes reviewed  ASSESSMENT AND PLAN     Last visit 8/16/22:  SOB with exertion  No chest pain  DDx includes heart failure; at risk of CHF due to chemo in the past; no anginal symptoms  Plan: BNP, CBC, CMP, Echo    2. Pure hypercholesterolemia   7/2/22 Lipid Panel   HDL 31   Crestor 10 mg daily  Plan: Continue current meds; Repeat lipid panel in 3 months; has upcoming CT lung cancer screening, if significant coronary calcifications are seen his LDL goal would be < 70    3.HTN  BP 
historical  intellectual property under my direct supervision.  Any additions to this intellectual property were performed in my presence and at my direction.  Furthermore, the content and accuracy of this note have been reviewed by me with edits by me as needed.  Jhonny Chowdhury MD 1/26/2024 8:44 AM

## 2024-01-26 ENCOUNTER — OFFICE VISIT (OUTPATIENT)
Dept: CARDIOLOGY CLINIC | Age: 79
End: 2024-01-26
Payer: MEDICARE

## 2024-01-26 VITALS
BODY MASS INDEX: 35.1 KG/M2 | HEART RATE: 79 BPM | DIASTOLIC BLOOD PRESSURE: 64 MMHG | WEIGHT: 237 LBS | HEIGHT: 69 IN | OXYGEN SATURATION: 96 % | SYSTOLIC BLOOD PRESSURE: 142 MMHG

## 2024-01-26 DIAGNOSIS — I35.8 NON-RHEUMATIC AORTIC SCLEROSIS: ICD-10-CM

## 2024-01-26 DIAGNOSIS — I50.32 CHRONIC DIASTOLIC HEART FAILURE (HCC): ICD-10-CM

## 2024-01-26 DIAGNOSIS — E78.00 PURE HYPERCHOLESTEROLEMIA: Primary | ICD-10-CM

## 2024-01-26 DIAGNOSIS — E66.9 OBESITY (BMI 30.0-34.9): ICD-10-CM

## 2024-01-26 DIAGNOSIS — I10 PRIMARY HYPERTENSION: ICD-10-CM

## 2024-01-26 DIAGNOSIS — R73.03 PREDIABETES: ICD-10-CM

## 2024-01-26 DIAGNOSIS — R06.09 DOE (DYSPNEA ON EXERTION): ICD-10-CM

## 2024-01-26 PROCEDURE — 99214 OFFICE O/P EST MOD 30 MIN: CPT | Performed by: INTERNAL MEDICINE

## 2024-01-26 PROCEDURE — G8484 FLU IMMUNIZE NO ADMIN: HCPCS | Performed by: INTERNAL MEDICINE

## 2024-01-26 PROCEDURE — G8427 DOCREV CUR MEDS BY ELIG CLIN: HCPCS | Performed by: INTERNAL MEDICINE

## 2024-01-26 PROCEDURE — 93000 ELECTROCARDIOGRAM COMPLETE: CPT | Performed by: INTERNAL MEDICINE

## 2024-01-26 PROCEDURE — 1123F ACP DISCUSS/DSCN MKR DOCD: CPT | Performed by: INTERNAL MEDICINE

## 2024-01-26 PROCEDURE — 1036F TOBACCO NON-USER: CPT | Performed by: INTERNAL MEDICINE

## 2024-01-26 PROCEDURE — G8417 CALC BMI ABV UP PARAM F/U: HCPCS | Performed by: INTERNAL MEDICINE

## 2024-01-26 PROCEDURE — 3078F DIAST BP <80 MM HG: CPT | Performed by: INTERNAL MEDICINE

## 2024-01-26 PROCEDURE — 3077F SYST BP >= 140 MM HG: CPT | Performed by: INTERNAL MEDICINE

## 2024-01-26 RX ORDER — HYDROCHLOROTHIAZIDE 25 MG/1
25 TABLET ORAL EVERY MORNING
Qty: 90 TABLET | Refills: 3 | Status: SHIPPED | OUTPATIENT
Start: 2024-01-26

## 2024-01-26 NOTE — PATIENT INSTRUCTIONS
Follow up with Dr Chowdhury in 1 year, sooner if needed    Increase Hydrochlorothiazide to 25mg daily     Call for any questions or concerns.

## 2024-02-08 DIAGNOSIS — I10 PRIMARY HYPERTENSION: ICD-10-CM

## 2024-02-08 RX ORDER — HYDROCHLOROTHIAZIDE 12.5 MG/1
CAPSULE, GELATIN COATED ORAL
Qty: 90 CAPSULE | Refills: 0 | OUTPATIENT
Start: 2024-02-08

## 2024-02-08 RX ORDER — IRBESARTAN 150 MG/1
150 TABLET ORAL DAILY
Qty: 90 TABLET | Refills: 0 | Status: SHIPPED | OUTPATIENT
Start: 2024-02-08

## 2024-02-15 ENCOUNTER — TELEPHONE (OUTPATIENT)
Dept: CARDIOLOGY CLINIC | Age: 79
End: 2024-02-15

## 2024-02-15 ENCOUNTER — HOSPITAL ENCOUNTER (OUTPATIENT)
Dept: NON INVASIVE DIAGNOSTICS | Age: 79
Discharge: HOME | End: 2024-02-15
Payer: MEDICARE

## 2024-02-15 DIAGNOSIS — R06.09 DOE (DYSPNEA ON EXERTION): ICD-10-CM

## 2024-02-15 DIAGNOSIS — R06.02 SOB (SHORTNESS OF BREATH): Primary | ICD-10-CM

## 2024-02-15 DIAGNOSIS — I49.8 BIGEMINY: ICD-10-CM

## 2024-02-15 DIAGNOSIS — I10 PRIMARY HYPERTENSION: ICD-10-CM

## 2024-02-15 PROCEDURE — 93017 CV STRESS TEST TRACING ONLY: CPT | Performed by: INTERNAL MEDICINE

## 2024-02-15 NOTE — PROGRESS NOTES
Patient instructed on [facility] Protocol Stress Test Procedure including possible side effects. Verbalizes knowledge and understanding and denies having any questions.

## 2024-02-15 NOTE — TELEPHONE ENCOUNTER
Dr Gonzalez spoke w/ Dr Chowdhury regarding patient's stress test. Per Dr Chowdhury, 7 day cardiac monitor, echo and see in office in a few weeks. Orders placed. Patient will go to [place] for echo and OV w/ Dr Chowdhury

## 2024-03-13 NOTE — PROGRESS NOTES
tachycardia) (HCC)     PVC (premature ventricular contraction)      CHF/CASEY - symptoms are improved but he is still NYHA II.  Given vascular risk factors, need to screen for CAD.  Given abnormal GXT (no ischemia but couldn't hit target heart rate; frequent PVCS) will get a Lexiscan.  Echo overall reassuring.  He is euvolemic on exam.  Will defer additional labwork at this time pending results of Lexiscan.  Continue Jardiance and BP control.  He does not need a diuretic currently.  HTN - not optimally controlled, will add metoprolol XL 25 to regimen  Obesity - discussed importance of weight loss and how weight could be contributing to his symptoms  Prediabetes - weight loss.  Continue Jardiance which he is on for dCHF.  Defer other management to PCP.  Aortic sclerosis - no stenosis yet, exam consistent with either sclerosis or at most mild stenosis.  Will plan to repeat echo q3 years (next 2027) unless new symptoms or change in murmur.  Discussed with patient that this may progress and become significant AS that could potentially need a TAVR down the road.  History of colon cancer - Echo today does not show any decline in systolic function after chemo  HLP - well-controlled.  Continue current medications.  PVCS/NSVT - Reviewed that LVEF is normal.  Need to exclude CAD for additiional risk stratification.  Will start metoprolol XL 25 daily to suppress ectopy.  Monitor for bradycardia.    Summary of Assessment and Plan:  4/4/24    Kelli soon - advised of no caffeine 24 hrs prior   Medication changes today - Start metoprolol XL 25mg daily   Continue risk factor modifications   Call for any new or worsening symptoms.     All new cardiac testing and lab results personally reviewed by me during this office visit and discussed with patient.    Patient counseled on lifestyle modification, diet, and exercise.    Follow Up: Return in about 6 months (around 10/4/2024).     DHEERAJ Chowdhury MD  [facility] [place] -

## 2024-03-27 PROBLEM — R06.09 DOE (DYSPNEA ON EXERTION): Status: ACTIVE | Noted: 2022-08-15

## 2024-03-27 PROBLEM — I50.32 CHRONIC DIASTOLIC HEART FAILURE (HCC): Status: ACTIVE | Noted: 2024-03-27

## 2024-03-27 PROBLEM — R73.03 PREDIABETES: Status: ACTIVE | Noted: 2024-03-27

## 2024-03-27 PROBLEM — I35.8 NON-RHEUMATIC AORTIC SCLEROSIS: Status: ACTIVE | Noted: 2024-03-27

## 2024-04-04 ENCOUNTER — PROCEDURE VISIT (OUTPATIENT)
Dept: CARDIOLOGY CLINIC | Age: 79
End: 2024-04-04
Payer: MEDICARE

## 2024-04-04 ENCOUNTER — OFFICE VISIT (OUTPATIENT)
Dept: CARDIOLOGY CLINIC | Age: 79
End: 2024-04-04

## 2024-04-04 VITALS
DIASTOLIC BLOOD PRESSURE: 70 MMHG | SYSTOLIC BLOOD PRESSURE: 150 MMHG | OXYGEN SATURATION: 93 % | HEART RATE: 69 BPM | WEIGHT: 236 LBS | BODY MASS INDEX: 34.96 KG/M2 | HEIGHT: 69 IN

## 2024-04-04 DIAGNOSIS — I49.3 PVC (PREMATURE VENTRICULAR CONTRACTION): ICD-10-CM

## 2024-04-04 DIAGNOSIS — I10 PRIMARY HYPERTENSION: ICD-10-CM

## 2024-04-04 DIAGNOSIS — R06.02 SOB (SHORTNESS OF BREATH): ICD-10-CM

## 2024-04-04 DIAGNOSIS — R94.39 ABNORMAL STRESS TEST: ICD-10-CM

## 2024-04-04 DIAGNOSIS — I47.29 NSVT (NONSUSTAINED VENTRICULAR TACHYCARDIA) (HCC): ICD-10-CM

## 2024-04-04 DIAGNOSIS — R73.03 PREDIABETES: ICD-10-CM

## 2024-04-04 DIAGNOSIS — E66.9 OBESITY (BMI 30.0-34.9): ICD-10-CM

## 2024-04-04 DIAGNOSIS — E78.00 PURE HYPERCHOLESTEROLEMIA: Primary | ICD-10-CM

## 2024-04-04 DIAGNOSIS — I35.8 NON-RHEUMATIC AORTIC SCLEROSIS: ICD-10-CM

## 2024-04-04 DIAGNOSIS — I50.32 CHRONIC DIASTOLIC HEART FAILURE (HCC): ICD-10-CM

## 2024-04-04 DIAGNOSIS — R06.09 DOE (DYSPNEA ON EXERTION): ICD-10-CM

## 2024-04-04 PROCEDURE — 93306 TTE W/DOPPLER COMPLETE: CPT | Performed by: INTERNAL MEDICINE

## 2024-04-04 RX ORDER — METOPROLOL SUCCINATE 25 MG/1
25 TABLET, EXTENDED RELEASE ORAL DAILY
Qty: 90 TABLET | Refills: 3 | Status: SHIPPED | OUTPATIENT
Start: 2024-04-04

## 2024-04-04 NOTE — PATIENT INSTRUCTIONS
Follow up with Dr Chowdhury in 6 months     Start Metoprolol XL 25mg daily     Kelli stress test soon     Call for any questions or concerns.

## 2024-04-12 DIAGNOSIS — E78.00 PURE HYPERCHOLESTEROLEMIA: ICD-10-CM

## 2024-04-12 RX ORDER — ROSUVASTATIN CALCIUM 10 MG/1
10 TABLET, COATED ORAL DAILY
Qty: 90 TABLET | Refills: 2 | Status: SHIPPED | OUTPATIENT
Start: 2024-04-12

## 2024-04-16 ENCOUNTER — TELEPHONE (OUTPATIENT)
Dept: CARDIOLOGY CLINIC | Age: 79
End: 2024-04-16

## 2024-04-16 DIAGNOSIS — E78.00 PURE HYPERCHOLESTEROLEMIA: ICD-10-CM

## 2024-04-16 RX ORDER — ROSUVASTATIN CALCIUM 10 MG/1
10 TABLET, COATED ORAL NIGHTLY
Qty: 30 TABLET | Refills: 0 | Status: SHIPPED | OUTPATIENT
Start: 2024-04-16

## 2024-04-16 NOTE — TELEPHONE ENCOUNTER
Pt called stating they are returning a call which pharmacy they are using? Pt is using [facility] [address], however they stated they do not need any medications at this time

## 2024-04-18 ENCOUNTER — TELEPHONE (OUTPATIENT)
Dept: CARDIOLOGY CLINIC | Age: 79
End: 2024-04-18

## 2024-04-18 ENCOUNTER — HOSPITAL ENCOUNTER (OUTPATIENT)
Dept: NON INVASIVE DIAGNOSTICS | Age: 79
Discharge: HOME | End: 2024-04-18
Attending: INTERNAL MEDICINE
Payer: MEDICARE

## 2024-04-18 DIAGNOSIS — I50.32 CHRONIC DIASTOLIC HEART FAILURE (HCC): ICD-10-CM

## 2024-04-18 PROCEDURE — A9502 TC99M TETROFOSMIN: HCPCS | Performed by: INTERNAL MEDICINE

## 2024-04-18 PROCEDURE — 93017 CV STRESS TEST TRACING ONLY: CPT | Performed by: INTERNAL MEDICINE

## 2024-04-18 PROCEDURE — 3430000000 HC RX DIAGNOSTIC RADIOPHARMACEUTICAL: Performed by: INTERNAL MEDICINE

## 2024-04-18 PROCEDURE — 78452 HT MUSCLE IMAGE SPECT MULT: CPT

## 2024-04-18 PROCEDURE — 6360000002 HC RX W HCPCS: Performed by: INTERNAL MEDICINE

## 2024-04-18 RX ORDER — REGADENOSON 0.08 MG/ML
0.4 INJECTION, SOLUTION INTRAVENOUS
OUTPATIENT
Start: 2024-04-18

## 2024-04-18 RX ORDER — REGADENOSON 0.08 MG/ML
0.4 INJECTION, SOLUTION INTRAVENOUS
Status: COMPLETED | OUTPATIENT
Start: 2024-04-18 | End: 2024-04-18

## 2024-04-18 RX ADMIN — TETROFOSMIN 10 MILLICURIE: 1.38 INJECTION, POWDER, LYOPHILIZED, FOR SOLUTION INTRAVENOUS at 07:26

## 2024-04-18 RX ADMIN — TETROFOSMIN 30 MILLICURIE: 1.38 INJECTION, POWDER, LYOPHILIZED, FOR SOLUTION INTRAVENOUS at 08:40

## 2024-04-18 RX ADMIN — REGADENOSON 0.4 MG: 0.08 INJECTION, SOLUTION INTRAVENOUS at 08:41

## 2024-04-18 NOTE — TELEPHONE ENCOUNTER
LMOM for pt with results per WAK. Advised to call back with questions or concerns.    ----- Message from Jhonny Chowdhury MD sent at 4/18/2024  3:52 PM EDT -----  Stress test normal  Please let patient know

## 2024-04-18 NOTE — TELEPHONE ENCOUNTER
----- Message from Jhonny Chowdhury MD sent at 4/18/2024  3:52 PM EDT -----  Stress test normal  Please let patient know

## 2024-04-23 NOTE — TELEPHONE ENCOUNTER
Received refill request for Jardiance 10mg tablets from [facility] pharmacy.     Last OV: 4-4-2024 WAK    Next OV: 11-6-2024 WAK    Last Labs: 11- CMP/CBC    Last Filled: 12- WAK

## 2024-05-10 DIAGNOSIS — I10 PRIMARY HYPERTENSION: ICD-10-CM

## 2024-05-10 RX ORDER — IRBESARTAN 150 MG/1
150 TABLET ORAL DAILY
Qty: 90 TABLET | Refills: 0 | Status: SHIPPED | OUTPATIENT
Start: 2024-05-10

## 2024-05-13 NOTE — TELEPHONE ENCOUNTER
Medication Refill    Medication needing refilled: empagliflozin (JARDIANCE) 10 MG     Dosage of the medication:    How are you taking this medication (QD, BID, TID, QID, PRN): one tab daily    30 or 90 day supply called in: Pt requesting 90 day supply    When will you run out of your medication:    Which Pharmacy are we sending the medication to?:   [facility] [id number] - [place] [address] [address] [phone] - F [phone]  [address] [facility] [address]  Phone: [phone]  Fax: [phone]

## 2024-05-13 NOTE — TELEPHONE ENCOUNTER
Rx sent over on 4/23/24 for 90 day supply with 1 refill.      I called and spoke to [facility].  They were having computer issues during that time and did not receive the prescription.      Refills sent.

## 2024-05-14 ENCOUNTER — HOSPITAL ENCOUNTER (OUTPATIENT)
Age: 79
Discharge: HOME | End: 2024-05-14
Payer: MEDICARE

## 2024-05-14 DIAGNOSIS — E78.00 PURE HYPERCHOLESTEROLEMIA: ICD-10-CM

## 2024-05-14 DIAGNOSIS — R73.9 HYPERGLYCEMIA: ICD-10-CM

## 2024-05-14 LAB
ALBUMIN SERPL-MCNC: 3.9 G/DL (ref 3.4–5)
ALBUMIN/GLOB SERPL: 1.3 {RATIO} (ref 1.1–2.2)
ALP SERPL-CCNC: 60 U/L (ref 40–129)
ALT SERPL-CCNC: 14 U/L (ref 10–40)
ANION GAP SERPL CALCULATED.3IONS-SCNC: 9 MMOL/L (ref 3–16)
AST SERPL-CCNC: 21 U/L (ref 15–37)
BASOPHILS # BLD: 0.1 K/UL (ref 0–0.2)
BASOPHILS NFR BLD: 1 %
BILIRUB SERPL-MCNC: 0.4 MG/DL (ref 0–1)
BUN SERPL-MCNC: 18 MG/DL (ref 7–20)
CALCIUM SERPL-MCNC: 9.3 MG/DL (ref 8.3–10.6)
CHLORIDE SERPL-SCNC: 105 MMOL/L (ref 99–110)
CHOLEST SERPL-MCNC: 141 MG/DL (ref 0–199)
CO2 SERPL-SCNC: 27 MMOL/L (ref 21–32)
CREAT SERPL-MCNC: 0.8 MG/DL (ref 0.8–1.3)
DEPRECATED RDW RBC AUTO: 14.5 % (ref 12.4–15.4)
EOSINOPHIL # BLD: 0.2 K/UL (ref 0–0.6)
EOSINOPHIL NFR BLD: 3.5 %
EST. AVERAGE GLUCOSE BLD GHB EST-MCNC: 125.5 MG/DL
GFR SERPLBLD CREATININE-BSD FMLA CKD-EPI: 90 ML/MIN/{1.73_M2}
GLUCOSE P FAST SERPL-MCNC: 102 MG/DL (ref 70–99)
HBA1C MFR BLD: 6 %
HCT VFR BLD AUTO: 43.7 % (ref 40.5–52.5)
HDLC SERPL-MCNC: 40 MG/DL (ref 40–60)
HGB BLD-MCNC: 14.6 G/DL (ref 13.5–17.5)
LDL CHOLESTEROL: 84 MG/DL
LYMPHOCYTES # BLD: 1.8 K/UL (ref 1–5.1)
LYMPHOCYTES NFR BLD: 30.8 %
MCH RBC QN AUTO: 29.3 PG (ref 26–34)
MCHC RBC AUTO-ENTMCNC: 33.5 G/DL (ref 31–36)
MCV RBC AUTO: 87.6 FL (ref 80–100)
MONOCYTES # BLD: 0.5 K/UL (ref 0–1.3)
MONOCYTES NFR BLD: 8.5 %
NEUTROPHILS # BLD: 3.4 K/UL (ref 1.7–7.7)
NEUTROPHILS NFR BLD: 56.2 %
PLATELET # BLD AUTO: 128 K/UL (ref 135–450)
PMV BLD AUTO: 10.3 FL (ref 5–10.5)
POTASSIUM SERPL-SCNC: 4.5 MMOL/L (ref 3.5–5.1)
PROT SERPL-MCNC: 6.8 G/DL (ref 6.4–8.2)
RBC # BLD AUTO: 4.99 M/UL (ref 4.2–5.9)
SODIUM SERPL-SCNC: 141 MMOL/L (ref 136–145)
TRIGL SERPL-MCNC: 86 MG/DL (ref 0–150)
TSH SERPL DL<=0.005 MIU/L-ACNC: 2.77 UIU/ML (ref 0.27–4.2)
VLDLC SERPL CALC-MCNC: 17 MG/DL
WBC # BLD AUTO: 6 K/UL (ref 4–11)

## 2024-05-14 PROCEDURE — 36415 COLL VENOUS BLD VENIPUNCTURE: CPT

## 2024-05-14 PROCEDURE — 80061 LIPID PANEL: CPT

## 2024-05-14 PROCEDURE — 84443 ASSAY THYROID STIM HORMONE: CPT

## 2024-05-14 PROCEDURE — 80053 COMPREHEN METABOLIC PANEL: CPT

## 2024-05-14 PROCEDURE — 85025 COMPLETE CBC W/AUTO DIFF WBC: CPT

## 2024-05-14 PROCEDURE — 83036 HEMOGLOBIN GLYCOSYLATED A1C: CPT

## 2024-05-14 SDOH — ECONOMIC STABILITY: FOOD INSECURITY: WITHIN THE PAST 12 MONTHS, THE FOOD YOU BOUGHT JUST DIDN'T LAST AND YOU DIDN'T HAVE MONEY TO GET MORE.: NEVER TRUE

## 2024-05-14 SDOH — ECONOMIC STABILITY: TRANSPORTATION INSECURITY
IN THE PAST 12 MONTHS, HAS LACK OF TRANSPORTATION KEPT YOU FROM MEETINGS, WORK, OR FROM GETTING THINGS NEEDED FOR DAILY LIVING?: NO

## 2024-05-14 SDOH — ECONOMIC STABILITY: FOOD INSECURITY: WITHIN THE PAST 12 MONTHS, YOU WORRIED THAT YOUR FOOD WOULD RUN OUT BEFORE YOU GOT MONEY TO BUY MORE.: NEVER TRUE

## 2024-05-14 SDOH — ECONOMIC STABILITY: INCOME INSECURITY: HOW HARD IS IT FOR YOU TO PAY FOR THE VERY BASICS LIKE FOOD, HOUSING, MEDICAL CARE, AND HEATING?: SOMEWHAT HARD

## 2024-05-14 SDOH — ECONOMIC STABILITY: HOUSING INSECURITY
IN THE LAST 12 MONTHS, WAS THERE A TIME WHEN YOU DID NOT HAVE A STEADY PLACE TO SLEEP OR SLEPT IN A SHELTER (INCLUDING NOW)?: YES

## 2024-05-14 ASSESSMENT — PATIENT HEALTH QUESTIONNAIRE - PHQ9
SUM OF ALL RESPONSES TO PHQ QUESTIONS 1-9: 0
1. LITTLE INTEREST OR PLEASURE IN DOING THINGS: NOT AT ALL
1. LITTLE INTEREST OR PLEASURE IN DOING THINGS: NOT AT ALL
SUM OF ALL RESPONSES TO PHQ9 QUESTIONS 1 & 2: 0
SUM OF ALL RESPONSES TO PHQ9 QUESTIONS 1 & 2: 0
SUM OF ALL RESPONSES TO PHQ QUESTIONS 1-9: 0
2. FEELING DOWN, DEPRESSED OR HOPELESS: NOT AT ALL
SUM OF ALL RESPONSES TO PHQ QUESTIONS 1-9: 0
SUM OF ALL RESPONSES TO PHQ QUESTIONS 1-9: 0
2. FEELING DOWN, DEPRESSED OR HOPELESS: NOT AT ALL

## 2024-05-15 ENCOUNTER — OFFICE VISIT (OUTPATIENT)
Dept: FAMILY MEDICINE CLINIC | Age: 79
End: 2024-05-15
Payer: MEDICARE

## 2024-05-15 VITALS
WEIGHT: 232 LBS | BODY MASS INDEX: 34.26 KG/M2 | DIASTOLIC BLOOD PRESSURE: 60 MMHG | TEMPERATURE: 97.2 F | OXYGEN SATURATION: 93 % | SYSTOLIC BLOOD PRESSURE: 134 MMHG | HEART RATE: 55 BPM

## 2024-05-15 DIAGNOSIS — R73.9 HYPERGLYCEMIA: ICD-10-CM

## 2024-05-15 DIAGNOSIS — E78.00 PURE HYPERCHOLESTEROLEMIA: Primary | ICD-10-CM

## 2024-05-15 DIAGNOSIS — I50.32 CHRONIC DIASTOLIC HEART FAILURE (HCC): ICD-10-CM

## 2024-05-15 DIAGNOSIS — Z12.5 SCREENING FOR MALIGNANT NEOPLASM OF PROSTATE: ICD-10-CM

## 2024-05-15 DIAGNOSIS — Z85.51 HISTORY OF BLADDER CANCER: ICD-10-CM

## 2024-05-15 DIAGNOSIS — R73.03 PREDIABETES: ICD-10-CM

## 2024-05-15 DIAGNOSIS — I10 PRIMARY HYPERTENSION: ICD-10-CM

## 2024-05-15 PROCEDURE — G8417 CALC BMI ABV UP PARAM F/U: HCPCS | Performed by: FAMILY MEDICINE

## 2024-05-15 PROCEDURE — 3078F DIAST BP <80 MM HG: CPT | Performed by: FAMILY MEDICINE

## 2024-05-15 PROCEDURE — 1123F ACP DISCUSS/DSCN MKR DOCD: CPT | Performed by: FAMILY MEDICINE

## 2024-05-15 PROCEDURE — 1036F TOBACCO NON-USER: CPT | Performed by: FAMILY MEDICINE

## 2024-05-15 PROCEDURE — G8427 DOCREV CUR MEDS BY ELIG CLIN: HCPCS | Performed by: FAMILY MEDICINE

## 2024-05-15 PROCEDURE — 99214 OFFICE O/P EST MOD 30 MIN: CPT | Performed by: FAMILY MEDICINE

## 2024-05-15 PROCEDURE — 3075F SYST BP GE 130 - 139MM HG: CPT | Performed by: FAMILY MEDICINE

## 2024-05-15 RX ORDER — ROSUVASTATIN CALCIUM 10 MG/1
10 TABLET, COATED ORAL NIGHTLY
Qty: 90 TABLET | Refills: 3 | Status: SHIPPED | OUTPATIENT
Start: 2024-05-15 | End: 2025-05-10

## 2024-05-15 ASSESSMENT — ENCOUNTER SYMPTOMS
BACK PAIN: 0
EYE ITCHING: 0
RHINORRHEA: 0
SHORTNESS OF BREATH: 1
DIARRHEA: 0
CHEST TIGHTNESS: 1
CONSTIPATION: 0

## 2024-05-15 NOTE — PROGRESS NOTES
mouth daily 90 tablet 3    hydroCHLOROthiazide (HYDRODIURIL) 25 MG tablet Take 1 tablet by mouth every morning 90 tablet 3     No current facility-administered medications on file prior to visit.        Review of Systems   Constitutional:  Negative for activity change, fatigue and unexpected weight change.   HENT:  Negative for congestion, postnasal drip and rhinorrhea.    Eyes:  Negative for itching.   Respiratory:  Positive for chest tightness (occ'l) and shortness of breath.    Cardiovascular:  Negative for chest pain and near-syncope.   Gastrointestinal:  Negative for constipation and diarrhea.   Genitourinary:  Negative for difficulty urinating.   Musculoskeletal:  Negative for back pain.   Neurological:  Negative for dizziness and light-headedness.   Psychiatric/Behavioral:  Negative for dysphoric mood and sleep disturbance. The patient is not nervous/anxious.        OBJECTIVE:    /60   Pulse 55   Temp 97.2 °F (36.2 °C) (Temporal)   Wt 105.2 kg (232 lb)   SpO2 93%   BMI 34.26 kg/m²    Physical Exam  Constitutional:       Appearance: He is well-developed.   HENT:      Head: Normocephalic and atraumatic.      Right Ear: External ear normal. Decreased hearing noted.      Left Ear: External ear normal. Decreased hearing noted.      Nose: Nose normal.   Eyes:      General:         Right eye: No discharge.      Conjunctiva/sclera: Conjunctivae normal.   Neck:      Thyroid: No thyromegaly.      Vascular: No JVD.      Trachea: No tracheal deviation.   Cardiovascular:      Rate and Rhythm: Normal rate and regular rhythm.      Heart sounds: Normal heart sounds.   Pulmonary:      Effort: Pulmonary effort is normal. No respiratory distress.      Breath sounds: Normal breath sounds. No rales.   Musculoskeletal:      Cervical back: Normal range of motion and neck supple.   Lymphadenopathy:      Cervical: No cervical adenopathy.   Skin:     General: Skin is warm and dry.   Neurological:      Mental Status: He is

## 2024-05-16 NOTE — TELEPHONE ENCOUNTER
I spoke with pt and reiterated ST results. He asked about the Jardiance> I told him that it was sent 5/13/24, may be delayed for a PA. He will call and check with the pharmacy.

## 2024-08-04 DIAGNOSIS — I10 PRIMARY HYPERTENSION: ICD-10-CM

## 2024-08-05 RX ORDER — IRBESARTAN 150 MG/1
150 TABLET ORAL DAILY
Qty: 90 TABLET | Refills: 0 | Status: SHIPPED | OUTPATIENT
Start: 2024-08-05

## 2024-10-25 NOTE — PROGRESS NOTES
[facility]      Cardiology Follow Up  [phone]  11/6/24  Referring: Dr. Burris, Subhash Monique Jr., MD (PCP)    REASON FOR CONSULT/CHIEF COMPLAINT/HPI     Reason for visit/ Chief complaint  CHF, medical mgmt, review new echo   HPI Matthew Adam is a 78 y.o. male patient here for 6 month follow up and management of diastolic heart failure and CASEY.    I saw him initially 8/2022 for routine evaluation.  At that time he had mild CASEY with mowing his lawn with a push-mower but was otherwise asymptomatic.  Echo was done and showed grade I diastolic dysfunction and aortic sclerosis without stenosis. I started Jardiance after that echo.    He is a colon cancer survivor.  He quit smoking in 2016.    Today he is doing well. Denies any CP, SOB, palpitations or dizziness. No recent hospitalizations. He is able to do all of his normal daily activities without issues. He gets tired with moving things around in the garage, but nothing more than he feels is more than age related.     Patient is adherent with medications and is tolerating them well without side effects     HISTORY/ALLERGIES/ROS     MedHx:  has a past medical history of Cancer (HCC), Chronic diastolic heart failure (HCC), Hyperlipidemia, Hypertension, Tobacco use disorder, Ventral hernia, and Villous adenoma.  SurgHx:  has a past surgical history that includes Rectal surgery; Cystoscopy; other surgical history; Colonoscopy (4/2/14); other surgical history (Left, 6/29/15); Abdomen surgery (1/2016); Cervical disc arthroplasty (2015); Knee arthroscopy (Left); Shoulder arthroscopy; hernia repair; and Colonoscopy (N/A, 10/25/2019).   SocHx:  reports that he quit smoking about 8 years ago. His smoking use included cigarettes. He started smoking about 62 years ago. He has a 60.1 pack-year smoking history. He has never used smokeless tobacco. He reports current alcohol use of about 2.0 standard drinks of alcohol per week. He reports that he does not use

## 2024-10-29 PROBLEM — I49.3 PVC (PREMATURE VENTRICULAR CONTRACTION): Status: ACTIVE | Noted: 2024-10-29

## 2024-10-29 PROBLEM — I47.29 NSVT (NONSUSTAINED VENTRICULAR TACHYCARDIA) (HCC): Status: ACTIVE | Noted: 2024-10-29

## 2024-10-31 DIAGNOSIS — I10 PRIMARY HYPERTENSION: ICD-10-CM

## 2024-10-31 RX ORDER — IRBESARTAN 150 MG/1
150 TABLET ORAL DAILY
Qty: 90 TABLET | Refills: 0 | Status: SHIPPED | OUTPATIENT
Start: 2024-10-31

## 2024-11-06 ENCOUNTER — OFFICE VISIT (OUTPATIENT)
Dept: CARDIOLOGY CLINIC | Age: 79
End: 2024-11-06

## 2024-11-06 VITALS
HEIGHT: 69 IN | DIASTOLIC BLOOD PRESSURE: 62 MMHG | HEART RATE: 70 BPM | BODY MASS INDEX: 34.33 KG/M2 | SYSTOLIC BLOOD PRESSURE: 138 MMHG | WEIGHT: 231.8 LBS | OXYGEN SATURATION: 98 %

## 2024-11-06 DIAGNOSIS — I35.8 NON-RHEUMATIC AORTIC SCLEROSIS: ICD-10-CM

## 2024-11-06 DIAGNOSIS — I50.32 CHRONIC DIASTOLIC HEART FAILURE (HCC): ICD-10-CM

## 2024-11-06 DIAGNOSIS — R06.09 DOE (DYSPNEA ON EXERTION): ICD-10-CM

## 2024-11-06 DIAGNOSIS — I47.29 NSVT (NONSUSTAINED VENTRICULAR TACHYCARDIA) (HCC): ICD-10-CM

## 2024-11-06 DIAGNOSIS — E78.00 PURE HYPERCHOLESTEROLEMIA: Primary | ICD-10-CM

## 2024-11-06 DIAGNOSIS — I10 PRIMARY HYPERTENSION: ICD-10-CM

## 2024-11-06 DIAGNOSIS — R73.03 PREDIABETES: ICD-10-CM

## 2024-11-06 DIAGNOSIS — E66.811 OBESITY (BMI 30.0-34.9): ICD-10-CM

## 2024-11-06 DIAGNOSIS — I49.3 PVC (PREMATURE VENTRICULAR CONTRACTION): ICD-10-CM

## 2024-11-07 ENCOUNTER — PROCEDURE VISIT (OUTPATIENT)
Dept: AUDIOLOGY | Age: 79
End: 2024-11-07
Payer: MEDICARE

## 2024-11-07 ENCOUNTER — OFFICE VISIT (OUTPATIENT)
Dept: ENT CLINIC | Age: 79
End: 2024-11-07
Payer: MEDICARE

## 2024-11-07 VITALS
WEIGHT: 234 LBS | OXYGEN SATURATION: 97 % | DIASTOLIC BLOOD PRESSURE: 71 MMHG | HEIGHT: 69 IN | TEMPERATURE: 98.4 F | SYSTOLIC BLOOD PRESSURE: 142 MMHG | HEART RATE: 64 BPM | BODY MASS INDEX: 34.66 KG/M2

## 2024-11-07 DIAGNOSIS — H90.3 SENSORINEURAL HEARING LOSS (SNHL) OF BOTH EARS: Primary | ICD-10-CM

## 2024-11-07 DIAGNOSIS — H93.11 TINNITUS OF RIGHT EAR: ICD-10-CM

## 2024-11-07 DIAGNOSIS — H93.13 SUBJECTIVE TINNITUS OF BOTH EARS: Chronic | ICD-10-CM

## 2024-11-07 DIAGNOSIS — K11.8 MASS OF LEFT PAROTID GLAND: Chronic | ICD-10-CM

## 2024-11-07 DIAGNOSIS — H69.92 EUSTACHIAN TUBE DYSFUNCTION, LEFT: ICD-10-CM

## 2024-11-07 DIAGNOSIS — H90.3 ASYMMETRIC SNHL (SENSORINEURAL HEARING LOSS): ICD-10-CM

## 2024-11-07 DIAGNOSIS — H90.3 ASYMMETRICAL SENSORINEURAL HEARING LOSS: Primary | Chronic | ICD-10-CM

## 2024-11-07 PROCEDURE — G8484 FLU IMMUNIZE NO ADMIN: HCPCS | Performed by: OTOLARYNGOLOGY

## 2024-11-07 PROCEDURE — G8427 DOCREV CUR MEDS BY ELIG CLIN: HCPCS | Performed by: OTOLARYNGOLOGY

## 2024-11-07 PROCEDURE — 92567 TYMPANOMETRY: CPT

## 2024-11-07 PROCEDURE — G8417 CALC BMI ABV UP PARAM F/U: HCPCS | Performed by: OTOLARYNGOLOGY

## 2024-11-07 PROCEDURE — 3078F DIAST BP <80 MM HG: CPT | Performed by: OTOLARYNGOLOGY

## 2024-11-07 PROCEDURE — 1036F TOBACCO NON-USER: CPT | Performed by: OTOLARYNGOLOGY

## 2024-11-07 PROCEDURE — 99214 OFFICE O/P EST MOD 30 MIN: CPT | Performed by: OTOLARYNGOLOGY

## 2024-11-07 PROCEDURE — 1123F ACP DISCUSS/DSCN MKR DOCD: CPT | Performed by: OTOLARYNGOLOGY

## 2024-11-07 PROCEDURE — 1159F MED LIST DOCD IN RCRD: CPT | Performed by: OTOLARYNGOLOGY

## 2024-11-07 PROCEDURE — 92557 COMPREHENSIVE HEARING TEST: CPT

## 2024-11-07 PROCEDURE — 3077F SYST BP >= 140 MM HG: CPT | Performed by: OTOLARYNGOLOGY

## 2024-11-07 NOTE — PROGRESS NOTES
Matthew Adam   1945, 78 y.o. male   [id number]       Referring Provider: Mark Verde MD  Referral Type: In an order in Epic    Reason for Visit: Evaluation of suspected change in hearing, tinnitus, or balance.    ADULT AUDIOLOGIC EVALUATION      Matthew Adam is a 78 y.o. male seen today, 11/7/2024 , for a recheck audiologic evaluation.  Patient was seen by Mark Verde MD following today's evaluation.    AUDIOLOGIC AND OTHER PERTINENT MEDICAL HISTORY:      Matthew Adam reports a thumping in his right ear for about 1 week that occurs when he yawns or turns his head quickly. He stated that he is also concerned that his hearing in his right ear has changed. He stated he will be getting new filters for the hearing aids tomorrow, so it could be that the hearing aids need cleaning.     Case history and results from previous audiogram on 7/5/2022  Matthew Adam noted history of occupational  noise exposure, longstanding history of non-functional left sided hearing loss. Two weeks ago, had phone next to ear and made a loud noise in his ear, felt discomfort in ear, later noticed cetain sounds perceived as \"static\" with lack of clarity. Notes he was told he had a hole in his eardrum, could not recall which side.     Testing revealed a moderate sloping to severe sensorineural hearing loss in the right ear and a severe mixed hearing loss int he left ear.     Date: 11/7/2024     IMPRESSIONS:      Today's results revealed a Mild sloping to Profound Sensorineural hearing loss in the right ear and a Moderately-Severe sloping to Profound Sensorineural hearing loss in the left ear. Good speech understanding when in quiet in the right; poor in the left ear. Tympanometry indicates low movement of ear drum/tympanic membrane, consistent with middle ear abnormality. Discussed test results and implications with patient. Continue use of hearing aids.    Follow medical recommendations of Mark Verde MD.     ASSESSMENT

## 2024-11-07 NOTE — PROGRESS NOTES
HISTORY  \"I'm hearing a puff noise in my right ear after I yawn, but not every time\", since onset \"about one week ago.  Occasionally it does it other times too.  Not consistent.  Today I have not heard it at all.  I thought I had lost hearing in my right ear but I now think it was the hearing aid that made it seem like that.\"  He did a reset of his hearing aid and then he could hear better.  He denied a decrease in hearing.  Hearing is \"back to the level I'm used to, normal for me.\"      EXAMINATION:  Ears:  TMs with monomeric membranes in the anterior areas which were hypermobile.  TMs were non-erythematous, dull, thickened, with patches of scerosis and no DANY.          AUDIOGRAM (today):              COUNSELING    Audiogram results were reviewed and discussed with Matthew, showing bilateral, asymmetric, left severe to profound and right moderate to severe, pan frequency sensorineural loss.  I advised of type and severity of hearing loss and the probable etiology of hearing loss of aging (presbycusis).  I discussed the possible associated impairment.  I advised of the need for avoidance of prolonged or frequent exposure to excessive noise and the need for noise protection, if such exposure is unavoidable.  I discussed the possible benefits of hearing aids, or other amplification therapy.  I discussed the possible etiology of tinnitus and treatment/coping measures including masking techniques.  I advised of the need for annual and prn hearing tests.  Patient was advised of the greater decrease in word and speech understanding in the presence of background noise and of the expected difficulty understanding speech in the presence of moderate to high volume background noise associated with this hearing loss.  I discussed acoustic neuroma (vestibular schwannoma), signs, symptoms, possible progression with loss of hearing, dizziness, facial nerve paralysis and compression of brain stem, and the need for an MRI scan of the

## 2024-11-07 NOTE — PATIENT INSTRUCTIONS
EUSTACHIAN TUBE DYSFUNCTION MEASURES    Please do the following to attempt to improve your Eustachian tube dysfunction  and/or to help to prevent fluid in your middle ear:  1.  Auto inflate your ears as instructed ( hold your nose closed, with your mouth closed and blow out as if blowing ear into or out of ears.  If not successful, then swallow while doing the auto inflation maneuver and maintaining the pressure) every three hours, while awake, for ten days, and then four times daily for 10 days, and then three times daily and as needed for ear congestion.     2.  Take one Mucinex (blue box) maximum strength 1200 mg tablet (or two 600 regular strength tablets)every 12 hours, daily for the next 14 days. (OTC)      3.  Use 0.05% Oxymetazoline (eg. Afrin, Duration, 4-Way) 12 hour decongestant nasal solution, with two sprays in each nostril three times a day for three days, then twice a day for two days, then stop for two days and then repeat the cycle once.

## 2024-11-19 SDOH — HEALTH STABILITY: PHYSICAL HEALTH: ON AVERAGE, HOW MANY DAYS PER WEEK DO YOU ENGAGE IN MODERATE TO STRENUOUS EXERCISE (LIKE A BRISK WALK)?: 4 DAYS

## 2024-11-19 SDOH — HEALTH STABILITY: PHYSICAL HEALTH: ON AVERAGE, HOW MANY MINUTES DO YOU ENGAGE IN EXERCISE AT THIS LEVEL?: 30 MIN

## 2024-11-19 ASSESSMENT — PATIENT HEALTH QUESTIONNAIRE - PHQ9
SUM OF ALL RESPONSES TO PHQ QUESTIONS 1-9: 0
SUM OF ALL RESPONSES TO PHQ9 QUESTIONS 1 & 2: 0
SUM OF ALL RESPONSES TO PHQ QUESTIONS 1-9: 0
SUM OF ALL RESPONSES TO PHQ QUESTIONS 1-9: 0
1. LITTLE INTEREST OR PLEASURE IN DOING THINGS: NOT AT ALL
2. FEELING DOWN, DEPRESSED OR HOPELESS: NOT AT ALL
SUM OF ALL RESPONSES TO PHQ QUESTIONS 1-9: 0

## 2024-11-20 ENCOUNTER — HOSPITAL ENCOUNTER (OUTPATIENT)
Age: 79
Discharge: HOME | End: 2024-11-20
Payer: MEDICARE

## 2024-11-20 DIAGNOSIS — R73.9 HYPERGLYCEMIA: ICD-10-CM

## 2024-11-20 DIAGNOSIS — E78.00 PURE HYPERCHOLESTEROLEMIA: ICD-10-CM

## 2024-11-20 DIAGNOSIS — Z12.5 SCREENING FOR MALIGNANT NEOPLASM OF PROSTATE: ICD-10-CM

## 2024-11-20 LAB
ALBUMIN SERPL-MCNC: 4 G/DL (ref 3.4–5)
ALBUMIN/GLOB SERPL: 1.5 {RATIO} (ref 1.1–2.2)
ALP SERPL-CCNC: 64 U/L (ref 40–129)
ALT SERPL-CCNC: 17 U/L (ref 10–40)
ANION GAP SERPL CALCULATED.3IONS-SCNC: 8 MMOL/L (ref 3–16)
AST SERPL-CCNC: 27 U/L (ref 15–37)
BASOPHILS # BLD: 0.1 K/UL (ref 0–0.2)
BASOPHILS NFR BLD: 0.9 %
BILIRUB SERPL-MCNC: 0.6 MG/DL (ref 0–1)
BUN SERPL-MCNC: 15 MG/DL (ref 7–20)
CALCIUM SERPL-MCNC: 9.5 MG/DL (ref 8.3–10.6)
CHLORIDE SERPL-SCNC: 107 MMOL/L (ref 99–110)
CHOLEST SERPL-MCNC: 133 MG/DL (ref 0–199)
CO2 SERPL-SCNC: 26 MMOL/L (ref 21–32)
CREAT SERPL-MCNC: 0.9 MG/DL (ref 0.8–1.3)
DEPRECATED RDW RBC AUTO: 14.4 % (ref 12.4–15.4)
EOSINOPHIL # BLD: 0.3 K/UL (ref 0–0.6)
EOSINOPHIL NFR BLD: 4.5 %
EST. AVERAGE GLUCOSE BLD GHB EST-MCNC: 122.6 MG/DL
GFR SERPLBLD CREATININE-BSD FMLA CKD-EPI: 86 ML/MIN/{1.73_M2}
GLUCOSE P FAST SERPL-MCNC: 103 MG/DL (ref 70–99)
HBA1C MFR BLD: 5.9 %
HCT VFR BLD AUTO: 44.8 % (ref 40.5–52.5)
HDLC SERPL-MCNC: 38 MG/DL (ref 40–60)
HGB BLD-MCNC: 15.1 G/DL (ref 13.5–17.5)
LDL CHOLESTEROL: 84 MG/DL
LYMPHOCYTES # BLD: 1.6 K/UL (ref 1–5.1)
LYMPHOCYTES NFR BLD: 22.2 %
MCH RBC QN AUTO: 29.7 PG (ref 26–34)
MCHC RBC AUTO-ENTMCNC: 33.6 G/DL (ref 31–36)
MCV RBC AUTO: 88.4 FL (ref 80–100)
MONOCYTES # BLD: 0.6 K/UL (ref 0–1.3)
MONOCYTES NFR BLD: 7.7 %
NEUTROPHILS # BLD: 4.7 K/UL (ref 1.7–7.7)
NEUTROPHILS NFR BLD: 64.7 %
PLATELET # BLD AUTO: 147 K/UL (ref 135–450)
PMV BLD AUTO: 10.1 FL (ref 5–10.5)
POTASSIUM SERPL-SCNC: 4.4 MMOL/L (ref 3.5–5.1)
PROT SERPL-MCNC: 6.6 G/DL (ref 6.4–8.2)
PSA SERPL DL<=0.01 NG/ML-MCNC: 0.26 NG/ML (ref 0–4)
RBC # BLD AUTO: 5.07 M/UL (ref 4.2–5.9)
SODIUM SERPL-SCNC: 141 MMOL/L (ref 136–145)
TRIGL SERPL-MCNC: 57 MG/DL (ref 0–150)
TSH SERPL DL<=0.005 MIU/L-ACNC: 2.06 UIU/ML (ref 0.27–4.2)
VLDLC SERPL CALC-MCNC: 11 MG/DL
WBC # BLD AUTO: 7.3 K/UL (ref 4–11)

## 2024-11-20 PROCEDURE — 80061 LIPID PANEL: CPT

## 2024-11-20 PROCEDURE — 84443 ASSAY THYROID STIM HORMONE: CPT

## 2024-11-20 PROCEDURE — 83036 HEMOGLOBIN GLYCOSYLATED A1C: CPT

## 2024-11-20 PROCEDURE — 84153 ASSAY OF PSA TOTAL: CPT

## 2024-11-20 PROCEDURE — 85025 COMPLETE CBC W/AUTO DIFF WBC: CPT

## 2024-11-20 PROCEDURE — 36415 COLL VENOUS BLD VENIPUNCTURE: CPT

## 2024-11-20 PROCEDURE — 80053 COMPREHEN METABOLIC PANEL: CPT

## 2024-11-21 ASSESSMENT — ENCOUNTER SYMPTOMS: SHORTNESS OF BREATH: 0

## 2024-11-21 NOTE — PROGRESS NOTES
grade B recommendation is to:  Screen for lung cancer with low-dose computed tomography (LDCT) every year.  Stop screening once a person has not smoked for 15 years or has a health problem that limits life expectancy or the ability to have lung surgery.    The patient  reports that he quit smoking about 8 years ago. His smoking use included cigarettes. He started smoking about 62 years ago. He has a 60.1 pack-year smoking history. He has never used smokeless tobacco.. Discussed with patient the risks and benefits of screening, including over-diagnosis, false positive rate, and total radiation exposure.  The patient currently exhibits no signs or symptoms suggestive of lung cancer.  Discussed with patient the importance of compliance with yearly annual lung cancer screenings and willingness to undergo diagnosis and treatment if screening scan is positive.  In addition, the patient was counseled regarding the importance of remaining smoke free and/or total smoking cessation.    Also reviewed the following if the patient has Medicare that as of February 10, 2022, Medicare only covers LDCT screening in patients aged 50-77 with at least a 20 pack-year smoking history who currently smoke or have quit in the last 15 years

## 2024-11-22 ENCOUNTER — OFFICE VISIT (OUTPATIENT)
Dept: FAMILY MEDICINE CLINIC | Age: 79
End: 2024-11-22

## 2024-11-22 VITALS
HEIGHT: 69 IN | TEMPERATURE: 97 F | OXYGEN SATURATION: 98 % | WEIGHT: 234 LBS | HEART RATE: 61 BPM | DIASTOLIC BLOOD PRESSURE: 72 MMHG | SYSTOLIC BLOOD PRESSURE: 122 MMHG | BODY MASS INDEX: 34.66 KG/M2

## 2024-11-22 DIAGNOSIS — I10 PRIMARY HYPERTENSION: ICD-10-CM

## 2024-11-22 DIAGNOSIS — Z87.891 PERSONAL HISTORY OF TOBACCO USE: ICD-10-CM

## 2024-11-22 DIAGNOSIS — E66.09 CLASS 1 OBESITY DUE TO EXCESS CALORIES WITHOUT SERIOUS COMORBIDITY WITH BODY MASS INDEX (BMI) OF 34.0 TO 34.9 IN ADULT: ICD-10-CM

## 2024-11-22 DIAGNOSIS — R73.9 HYPERGLYCEMIA: ICD-10-CM

## 2024-11-22 DIAGNOSIS — Z23 NEED FOR INFLUENZA VACCINATION: ICD-10-CM

## 2024-11-22 DIAGNOSIS — E66.811 CLASS 1 OBESITY DUE TO EXCESS CALORIES WITHOUT SERIOUS COMORBIDITY WITH BODY MASS INDEX (BMI) OF 34.0 TO 34.9 IN ADULT: ICD-10-CM

## 2024-11-22 DIAGNOSIS — Z87.891 SMOKING HISTORY: ICD-10-CM

## 2024-11-22 DIAGNOSIS — E78.00 PURE HYPERCHOLESTEROLEMIA: ICD-10-CM

## 2024-11-22 DIAGNOSIS — Z00.00 MEDICARE ANNUAL WELLNESS VISIT, SUBSEQUENT: Primary | ICD-10-CM

## 2024-11-22 ASSESSMENT — ENCOUNTER SYMPTOMS
CONSTIPATION: 0
DIARRHEA: 0
BACK PAIN: 0
COUGH: 0
RHINORRHEA: 0

## 2024-11-22 NOTE — ASSESSMENT & PLAN NOTE
Chronic, not at goal (unstable), Mediterranean diet and lifestyle modifications recommended

## 2024-11-22 NOTE — TELEPHONE ENCOUNTER
Medication Refill    Medication needing refilled:  empagliflozin (JARDIANCE)   Dosage of the medication:  10 MG tablet   How are you taking this medication (QD, BID, TID, QID, PRN):  Take 1 tablet by mouth daily   30 or 90 day supply called in:  90 day supply  When will you run out of your medication:  By Monday   Which Pharmacy are we sending the medication to?:  [facility] [id number] - [place] [address] [address] [phone] - F [phone]

## 2024-11-22 NOTE — PATIENT INSTRUCTIONS
measure your ADLs, your doctor will ask how hard it is for you to do routine tasks. Your doctor may also want to know if you have changed the way you do a task because of a health problem. Your doctor may watch how you:  Walk back and forth.  Keep your balance while you stand or walk.  Move from sitting to standing or from a bed to a chair.  Button or unbutton a shirt or sweater.  Remove and put on your shoes.  It's common to feel a little worried or anxious if you find you can't do all the things you used to be able to do. Talking with your doctor about ADLs is a way to make sure you're as safe as possible and able to care for yourself as well as you can. You may want to bring a caregiver, friend, or family member to your checkup. They can help you talk to your doctor.  Follow-up care is a key part of your treatment and safety. Be sure to make and go to all appointments, and call your doctor if you are having problems. It's also a good idea to know your test results and keep a list of the medicines you take.  Current as of: October 24, 2023  Content Version: 14.2  © 2024 [employer] ???.   Care instructions adapted under license by [employer]. If you have questions about a medical condition or this instruction, always ask your healthcare professional. [employer] disclaims any warranty or liability for your use of this information.           Eating Healthy Foods: Care Instructions  With every meal, you can make healthy food choices. Try to eat a variety of fruits, vegetables, whole grains, lean proteins, and low-fat dairy products. This can help you get the right balance of nutrients, including vitamins and minerals. Small changes add up over time. You can start by adding one healthy food to your meals each day.    Try to make half your plate fruits and vegetables, one-fourth whole grains, and one-fourth lean proteins. Try including dairy with your meals.   Eat more fruits and vegetables. Try

## 2024-11-22 NOTE — ASSESSMENT & PLAN NOTE
LDL is at goal of less than 100 with a value of 84.  HDL 38.  Triglycerides 57    Orders:    Comprehensive Metabolic Panel, Fasting; Future    Lipid, Fasting; Future

## 2024-12-23 DIAGNOSIS — I10 PRIMARY HYPERTENSION: ICD-10-CM

## 2024-12-23 RX ORDER — HYDROCHLOROTHIAZIDE 25 MG/1
25 TABLET ORAL EVERY MORNING
Qty: 30 TABLET | Refills: 0 | Status: SHIPPED | OUTPATIENT
Start: 2024-12-23

## 2024-12-23 NOTE — TELEPHONE ENCOUNTER
Received refill request for HCTZ from [facility] pharmacy.     Last OV: 11/6/2024 WAK    Next OV: 10/23/2024 WAK    Last Labs: 11/20/2024 CMP

## 2025-01-20 DIAGNOSIS — I10 PRIMARY HYPERTENSION: ICD-10-CM

## 2025-01-20 RX ORDER — HYDROCHLOROTHIAZIDE 25 MG/1
25 TABLET ORAL EVERY MORNING
Qty: 90 TABLET | Refills: 3 | Status: SHIPPED | OUTPATIENT
Start: 2025-01-20

## 2025-01-20 NOTE — TELEPHONE ENCOUNTER
Received refill request for HYDROCHLOROTHIAZIDE 25 Mg from [facility] [place] [id number] pharmacy.     Last OV: 11/06/24    Next OV: 10/23/25    Last Labs:   CMP 05/14/24  Last Filled: 12/24/24

## 2025-01-27 DIAGNOSIS — E78.00 PURE HYPERCHOLESTEROLEMIA: ICD-10-CM

## 2025-01-27 RX ORDER — ROSUVASTATIN CALCIUM 10 MG/1
10 TABLET, COATED ORAL NIGHTLY
Qty: 90 TABLET | Refills: 3 | Status: SHIPPED | OUTPATIENT
Start: 2025-01-27 | End: 2026-01-22

## 2025-01-31 DIAGNOSIS — I10 PRIMARY HYPERTENSION: ICD-10-CM

## 2025-01-31 RX ORDER — IRBESARTAN 150 MG/1
150 TABLET ORAL DAILY
Qty: 90 TABLET | Refills: 1 | Status: SHIPPED | OUTPATIENT
Start: 2025-01-31

## 2025-02-03 DIAGNOSIS — I10 PRIMARY HYPERTENSION: ICD-10-CM

## 2025-02-03 RX ORDER — IRBESARTAN 150 MG/1
150 TABLET ORAL DAILY
Qty: 90 TABLET | Refills: 1 | Status: SHIPPED | OUTPATIENT
Start: 2025-02-03

## 2025-03-12 ENCOUNTER — TELEPHONE (OUTPATIENT)
Dept: FAMILY MEDICINE CLINIC | Age: 80
End: 2025-03-12

## 2025-03-12 NOTE — TELEPHONE ENCOUNTER
FYI  Patient wanted you to be aware [facility] will be sending new orders for colostomy  supplies.

## 2025-03-31 RX ORDER — METOPROLOL SUCCINATE 25 MG/1
25 TABLET, EXTENDED RELEASE ORAL DAILY
Qty: 90 TABLET | Refills: 3 | Status: SHIPPED | OUTPATIENT
Start: 2025-03-31

## 2025-03-31 NOTE — TELEPHONE ENCOUNTER
Last ov:24 WAK  Next ov:10/23/25 WAK  Last EK24  Last labs:24  Last filled:   Disp Refills Start End    metoprolol succinate (TOPROL XL) 25 MG extended release tablet 90 tablet 3 2024 --    Sig - Route: Take 1 tablet by mouth daily - Oral    Sent to pharmacy as: Metoprolol Succinate ER 25 MG Oral Tablet Extended Release 24 Hour (Toprol XL)    Cosign for Ordering: Accepted by Jhonny Chowdhury MD on 2024  1:07 PM    E-Prescribing Status: Receipt confirmed by pharmacy (2024 10:00 AM EDT

## 2025-05-19 SDOH — ECONOMIC STABILITY: FOOD INSECURITY: WITHIN THE PAST 12 MONTHS, YOU WORRIED THAT YOUR FOOD WOULD RUN OUT BEFORE YOU GOT MONEY TO BUY MORE.: NEVER TRUE

## 2025-05-19 SDOH — ECONOMIC STABILITY: FOOD INSECURITY: WITHIN THE PAST 12 MONTHS, THE FOOD YOU BOUGHT JUST DIDN'T LAST AND YOU DIDN'T HAVE MONEY TO GET MORE.: NEVER TRUE

## 2025-05-19 SDOH — ECONOMIC STABILITY: INCOME INSECURITY: IN THE LAST 12 MONTHS, WAS THERE A TIME WHEN YOU WERE NOT ABLE TO PAY THE MORTGAGE OR RENT ON TIME?: NO

## 2025-05-19 SDOH — ECONOMIC STABILITY: TRANSPORTATION INSECURITY
IN THE PAST 12 MONTHS, HAS THE LACK OF TRANSPORTATION KEPT YOU FROM MEDICAL APPOINTMENTS OR FROM GETTING MEDICATIONS?: NO

## 2025-05-19 ASSESSMENT — PATIENT HEALTH QUESTIONNAIRE - PHQ9
1. LITTLE INTEREST OR PLEASURE IN DOING THINGS: NOT AT ALL
SUM OF ALL RESPONSES TO PHQ9 QUESTIONS 1 & 2: 0
SUM OF ALL RESPONSES TO PHQ QUESTIONS 1-9: 0
2. FEELING DOWN, DEPRESSED OR HOPELESS: NOT AT ALL
1. LITTLE INTEREST OR PLEASURE IN DOING THINGS: NOT AT ALL
SUM OF ALL RESPONSES TO PHQ QUESTIONS 1-9: 0
2. FEELING DOWN, DEPRESSED OR HOPELESS: NOT AT ALL

## 2025-05-20 DIAGNOSIS — E78.00 PURE HYPERCHOLESTEROLEMIA: ICD-10-CM

## 2025-05-20 DIAGNOSIS — R73.9 HYPERGLYCEMIA: ICD-10-CM

## 2025-05-21 LAB
ALBUMIN SERPL-MCNC: 4.3 G/DL (ref 3.4–5)
ALBUMIN/GLOB SERPL: 1.7 {RATIO} (ref 1.1–2.2)
ALP SERPL-CCNC: 66 U/L (ref 40–129)
ALT SERPL-CCNC: 16 U/L (ref 10–40)
ANION GAP SERPL CALCULATED.3IONS-SCNC: 9 MMOL/L (ref 3–16)
AST SERPL-CCNC: 25 U/L (ref 15–37)
BILIRUB SERPL-MCNC: 0.5 MG/DL (ref 0–1)
BUN SERPL-MCNC: 20 MG/DL (ref 7–20)
CALCIUM SERPL-MCNC: 9.5 MG/DL (ref 8.3–10.6)
CHLORIDE SERPL-SCNC: 104 MMOL/L (ref 99–110)
CHOLEST SERPL-MCNC: 153 MG/DL (ref 0–199)
CO2 SERPL-SCNC: 27 MMOL/L (ref 21–32)
CREAT SERPL-MCNC: 0.9 MG/DL (ref 0.8–1.3)
EST. AVERAGE GLUCOSE BLD GHB EST-MCNC: 125.5 MG/DL
GFR SERPLBLD CREATININE-BSD FMLA CKD-EPI: 86 ML/MIN/{1.73_M2}
GLUCOSE P FAST SERPL-MCNC: 107 MG/DL (ref 70–99)
HBA1C MFR BLD: 6 %
HDLC SERPL-MCNC: 37 MG/DL (ref 40–60)
LDL CHOLESTEROL: 96 MG/DL
POTASSIUM SERPL-SCNC: 4.3 MMOL/L (ref 3.5–5.1)
PROT SERPL-MCNC: 6.8 G/DL (ref 6.4–8.2)
SODIUM SERPL-SCNC: 140 MMOL/L (ref 136–145)
TRIGL SERPL-MCNC: 102 MG/DL (ref 0–150)
VLDLC SERPL CALC-MCNC: 20 MG/DL

## 2025-05-21 NOTE — PROGRESS NOTES
SUBJECTIVE:    Matthew Adam is a 79 y.o. male who presents for a follow up visit.    No chief complaint on file.       HPI     Patient's medications, allergies, past medical,surgical, social and family histories were reviewed and updated as appropriate.     Past Medical History:   Diagnosis Date    Cancer (HCC)     rectal and bladder    Chronic diastolic heart failure (HCC) 2024    Hyperlipidemia     borderline-no meds    Hypertension     Tobacco use disorder     Ventral hernia 2017    Villous adenoma      Past Surgical History:   Procedure Laterality Date    ABDOMEN SURGERY  2016    ap resection for rectal cancer    CERVICAL DISC ARTHROPLASTY      C5    COLONOSCOPY  14    with ultrasound, biopsy taken    COLONOSCOPY N/A 10/25/2019    COLONOSCOPY POLYPECTOMY REMOVAL HOT BIOPSY/STOMA performed by Norris Esposito MD at [facility] ENDOSCOPY    CYSTOSCOPY      exc bladder tumor    HERNIA REPAIR      KNEE ARTHROSCOPY Left     OTHER SURGICAL HISTORY      EUS with fine needle biopsy of lymph node 11    OTHER SURGICAL HISTORY Left 6/29/15    left port a cath placement    RECTAL SURGERY      for cancer    SHOULDER ARTHROSCOPY       Family History   Problem Relation Age of Onset    Lung Cancer Mother     Early Death Father     Diabetes Sister     Diabetes Brother      Social History     Tobacco Use    Smoking status: Former     Current packs/day: 0.00     Average packs/day: 1 pack/day for 58.0 years (60.1 ttl pk-yrs)     Types: Cigarettes     Start date: 1962     Quit date: 2016     Years since quittin.3    Smokeless tobacco: Never    Tobacco comments:     quit 2016   Substance Use Topics    Alcohol use: Yes     Alcohol/week: 2.0 standard drinks of alcohol     Types: 2 Cans of beer per week     Comment: rare      Allergies   Allergen Reactions    Lisinopril Other (See Comments)     Itchy throat with swelling-difficulty swallowing     Current Outpatient Medications on File Prior to

## 2025-05-21 NOTE — PROGRESS NOTES
SUBJECTIVE:    Matthew Adam is a 79 y.o. male who presents for a follow up visit.    No chief complaint on file.       HPI     Patient's medications, allergies, past medical,surgical, social and family histories were reviewed and updated as appropriate.     Past Medical History:   Diagnosis Date    Cancer (HCC)     rectal and bladder    Chronic diastolic heart failure (HCC) 2024    Hyperlipidemia     borderline-no meds    Hypertension     Tobacco use disorder     Ventral hernia 2017    Villous adenoma      Past Surgical History:   Procedure Laterality Date    ABDOMEN SURGERY  2016    ap resection for rectal cancer    CERVICAL DISC ARTHROPLASTY      C5    COLONOSCOPY  14    with ultrasound, biopsy taken    COLONOSCOPY N/A 10/25/2019    COLONOSCOPY POLYPECTOMY REMOVAL HOT BIOPSY/STOMA performed by Norris Esposito MD at [facility] ENDOSCOPY    CYSTOSCOPY      exc bladder tumor    HERNIA REPAIR      KNEE ARTHROSCOPY Left     OTHER SURGICAL HISTORY      EUS with fine needle biopsy of lymph node 11    OTHER SURGICAL HISTORY Left 6/29/15    left port a cath placement    RECTAL SURGERY      for cancer    SHOULDER ARTHROSCOPY       Family History   Problem Relation Age of Onset    Lung Cancer Mother     Early Death Father     Diabetes Sister     Diabetes Brother      Social History     Tobacco Use    Smoking status: Former     Current packs/day: 0.00     Average packs/day: 1 pack/day for 58.0 years (60.1 ttl pk-yrs)     Types: Cigarettes     Start date: 1962     Quit date: 2016     Years since quittin.3    Smokeless tobacco: Never    Tobacco comments:     quit 2016   Substance Use Topics    Alcohol use: Yes     Alcohol/week: 2.0 standard drinks of alcohol     Types: 2 Cans of beer per week     Comment: rare      Allergies   Allergen Reactions    Lisinopril Other (See Comments)     Itchy throat with swelling-difficulty swallowing     Current Outpatient Medications on File Prior to

## 2025-05-21 NOTE — PROGRESS NOTES
SUBJECTIVE:    Matthew Adam is a 79 y.o. male who presents for a follow up visit.    Chief Complaint   Patient presents with    Follow-up        Hyperlipidemia  This is a chronic problem. The current episode started more than 1 year ago. Lipid results: Total cholesterol 153, triglycerides 102, HDL 37, LDL 96. Exacerbating diseases include obesity. Factors aggravating his hyperlipidemia include beta blockers and thiazides. Pertinent negatives include no shortness of breath. Current antihyperlipidemic treatment includes statins. The current treatment provides significant improvement of lipids. Compliance problems include adherence to exercise and adherence to diet.  Risk factors for coronary artery disease include dyslipidemia, male sex, obesity, hypertension and a sedentary lifestyle.   Hypertension  This is a chronic problem. The current episode started more than 1 year ago. The problem is controlled. Pertinent negatives include no palpitations or shortness of breath. Risk factors for coronary artery disease include dyslipidemia, obesity, male gender and sedentary lifestyle. Past treatments include angiotensin blockers, beta blockers and diuretics. The current treatment provides significant improvement. Compliance problems include exercise and diet.  Hypertensive end-organ damage includes heart failure.        Patient's medications, allergies, past medical,surgical, social and family histories were reviewed and updated as appropriate.     Past Medical History:   Diagnosis Date    Cancer (HCC)     rectal and bladder    Chronic diastolic heart failure (HCC) 03/27/2024    Hyperlipidemia     borderline-no meds    Hypertension     Tobacco use disorder     Ventral hernia 04/26/2017    Villous adenoma      Past Surgical History:   Procedure Laterality Date    ABDOMEN SURGERY  1/2016    ap resection for rectal cancer    CERVICAL DISC ARTHROPLASTY  2015    C5    COLONOSCOPY  4/2/14    with ultrasound, biopsy taken

## 2025-05-22 ENCOUNTER — OFFICE VISIT (OUTPATIENT)
Dept: FAMILY MEDICINE CLINIC | Age: 80
End: 2025-05-22
Payer: MEDICARE

## 2025-05-22 VITALS
OXYGEN SATURATION: 95 % | DIASTOLIC BLOOD PRESSURE: 64 MMHG | SYSTOLIC BLOOD PRESSURE: 130 MMHG | HEART RATE: 90 BPM | TEMPERATURE: 98 F | BODY MASS INDEX: 34.56 KG/M2 | WEIGHT: 234 LBS

## 2025-05-22 DIAGNOSIS — I10 PRIMARY HYPERTENSION: ICD-10-CM

## 2025-05-22 DIAGNOSIS — Z12.5 SCREENING FOR MALIGNANT NEOPLASM OF PROSTATE: ICD-10-CM

## 2025-05-22 DIAGNOSIS — E78.00 PURE HYPERCHOLESTEROLEMIA: Primary | ICD-10-CM

## 2025-05-22 DIAGNOSIS — R73.03 PREDIABETES: ICD-10-CM

## 2025-05-22 DIAGNOSIS — I50.32 CHRONIC DIASTOLIC HEART FAILURE (HCC): ICD-10-CM

## 2025-05-22 DIAGNOSIS — E66.811 OBESITY (BMI 30.0-34.9): ICD-10-CM

## 2025-05-22 PROBLEM — I47.29 NSVT (NONSUSTAINED VENTRICULAR TACHYCARDIA) (HCC): Status: RESOLVED | Noted: 2024-10-29 | Resolved: 2025-05-22

## 2025-05-22 PROCEDURE — 3075F SYST BP GE 130 - 139MM HG: CPT | Performed by: FAMILY MEDICINE

## 2025-05-22 PROCEDURE — 99214 OFFICE O/P EST MOD 30 MIN: CPT | Performed by: FAMILY MEDICINE

## 2025-05-22 PROCEDURE — G8417 CALC BMI ABV UP PARAM F/U: HCPCS | Performed by: FAMILY MEDICINE

## 2025-05-22 PROCEDURE — 3078F DIAST BP <80 MM HG: CPT | Performed by: FAMILY MEDICINE

## 2025-05-22 PROCEDURE — 1036F TOBACCO NON-USER: CPT | Performed by: FAMILY MEDICINE

## 2025-05-22 PROCEDURE — 1159F MED LIST DOCD IN RCRD: CPT | Performed by: FAMILY MEDICINE

## 2025-05-22 PROCEDURE — 1123F ACP DISCUSS/DSCN MKR DOCD: CPT | Performed by: FAMILY MEDICINE

## 2025-05-22 PROCEDURE — G8427 DOCREV CUR MEDS BY ELIG CLIN: HCPCS | Performed by: FAMILY MEDICINE

## 2025-05-22 ASSESSMENT — ENCOUNTER SYMPTOMS
RHINORRHEA: 0
BACK PAIN: 0
CHEST TIGHTNESS: 0
CONSTIPATION: 0
DIARRHEA: 0
SHORTNESS OF BREATH: 0

## 2025-08-10 DIAGNOSIS — I10 PRIMARY HYPERTENSION: ICD-10-CM

## 2025-08-11 RX ORDER — IRBESARTAN 150 MG/1
150 TABLET ORAL DAILY
Qty: 90 TABLET | Refills: 1 | Status: SHIPPED | OUTPATIENT
Start: 2025-08-11

## (undated) DEVICE — CONTAINER SPEC 480ML CLR POLYSTYR 10% NEUT BUFF FRMLN ZN

## (undated) DEVICE — FORCEPS BX 240CM 2.4MM L NDL RAD JAW 4 M00513334

## (undated) DEVICE — ENDOSCOPY KIT: Brand: MEDLINE INDUSTRIES, INC.